# Patient Record
Sex: MALE | Race: WHITE | Employment: FULL TIME | URBAN - METROPOLITAN AREA
[De-identification: names, ages, dates, MRNs, and addresses within clinical notes are randomized per-mention and may not be internally consistent; named-entity substitution may affect disease eponyms.]

---

## 2018-03-12 ENCOUNTER — OFFICE VISIT (OUTPATIENT)
Dept: FAMILY MEDICINE CLINIC | Age: 48
End: 2018-03-12

## 2018-03-12 VITALS
HEART RATE: 61 BPM | OXYGEN SATURATION: 98 % | RESPIRATION RATE: 18 BRPM | HEIGHT: 75 IN | SYSTOLIC BLOOD PRESSURE: 147 MMHG | DIASTOLIC BLOOD PRESSURE: 89 MMHG | TEMPERATURE: 98.1 F

## 2018-03-12 DIAGNOSIS — S82.91XA CLOSED FRACTURE OF RIGHT LOWER LEG, INITIAL ENCOUNTER: Primary | ICD-10-CM

## 2018-03-12 RX ORDER — OXYCODONE AND ACETAMINOPHEN 10; 325 MG/1; MG/1
1 TABLET ORAL
Qty: 30 TAB | Refills: 0 | Status: SHIPPED | OUTPATIENT
Start: 2018-03-12 | End: 2018-04-06 | Stop reason: SDUPTHER

## 2018-03-12 RX ORDER — OXYCODONE AND ACETAMINOPHEN 10; 325 MG/1; MG/1
1 TABLET ORAL
COMMUNITY
End: 2018-03-13 | Stop reason: SDUPTHER

## 2018-03-12 RX ORDER — IBUPROFEN 600 MG/1
800 TABLET ORAL
COMMUNITY
End: 2018-06-11 | Stop reason: SDUPTHER

## 2018-03-12 RX ORDER — LEVOTHYROXINE SODIUM 75 UG/1
TABLET ORAL
COMMUNITY
End: 2018-11-07 | Stop reason: SDUPTHER

## 2018-03-12 NOTE — MR AVS SNAPSHOT
[facility] 
 
 
 [address] [address] 
[phone] Patient: Mamadou Cisneros MRN: [id number] BLW:1/8/2035 Visit Information Date & Time Provider Department Dept. Phone Encounter #  
 3/12/2018 11:45 AM Ne Aaron NP [address] [place] [address] [id number] Upcoming Health Maintenance Date Due DTaP/Tdap/Td series (1 - Tdap) 7/5/1989 Influenza Age 5 to Adult 8/1/2017 Allergies as of 3/12/2018  Review Complete On: 3/12/2018 By: Ne Aaron NP No Known Allergies Current Immunizations  Never Reviewed No immunizations on file. Not reviewed this visit You Were Diagnosed With   
  
 Codes Comments Closed fracture of right lower leg, initial encounter    -  Primary ICD-10-CM: S82. 91XA ICD-9-CM: 823.80 Vitals BP Pulse Temp Resp Height(growth percentile) SpO2  
 147/89 (BP 1 Location: Left arm, BP Patient Position: Sitting) 61 98.1 °F (36.7 °C) 18 6' 3\" (1.905 m) 98% Smoking Status Former Smoker Vitals History Your Updated Medication List  
  
   
This list is accurate as of 3/12/18 12:38 PM.  Always use your most recent med list.  
  
  
  
  
 ELIQUIS 2.5 mg tablet Generic drug:  apixaban Take 2.5 mg by mouth two (2) times a day. ibuprofen 600 mg tablet Commonly known as:  MOTRIN Take  by mouth every six (6) hours as needed for Pain.  
  
 levothyroxine 75 mcg tablet Commonly known as:  SYNTHROID Take  by mouth Daily (before breakfast). * oxyCODONE-acetaminophen  mg per tablet Commonly known as:  PERCOCET 10 Take 1 Tab by mouth every four (4) hours as needed for Pain. * oxyCODONE-acetaminophen  mg per tablet Commonly known as:  PERCOCET Take 1 Tab by mouth every four (4) hours as needed for Pain. Max Daily Amount: 6 Tabs. * Notice:   This list has 2 medication(s) that are the same as other medications prescribed for you. Read the directions carefully, and ask your doctor or other care provider to review them with you. Prescriptions Printed Refills  
 oxyCODONE-acetaminophen (PERCOCET)  mg per tablet 0 Sig: Take 1 Tab by mouth every four (4) hours as needed for Pain. Max Daily Amount: 6 Tabs. Class: Print Route: Oral  
  
We Performed the Following REFERRAL TO ORTHOPEDICS [TKO835 Custom] Referral Information Referral ID Referred By Referred To  
  
 [id number] BE, Melvi [facility]   
   [address], [address] [place] gal[place], [address] Phone: [phone] Fax: [phone] Visits Status Start Date End Date 1 New Request 3/12/18 3/12/19 If your referral has a status of pending review or denied, additional information will be sent to support the outcome of this decision. Patient Instructions Constipation: Duclolax powder twice a day Colase 100mg twice a day Introducing [facility] SERVICES! Mariya Hamm introduces [employer] patient portal. Now you can access parts of your medical record, email your doctor's office, and request medication refills online. 1. In your internet browser, go to [email or web address] 2. Click on the First Time User? Click Here link in the Sign In box. You will see the New Member Sign Up page. 3. Enter your [employer] Access Code exactly as it appears below. You will not need to use this code after youve completed the sign-up process. If you do not sign up before the expiration date, you must request a new code. · [employer] Access Code: [email or web address] Expires: 5/20/2018 10:20 AM 
 
4. Enter the last four digits of your Social Security Number (xxxx) and Date of Birth (mm/dd/yyyy) as indicated and click Submit. You will be taken to the next sign-up page. 5. Create a [employer] ID.  This will be your [employer] login ID and cannot be changed, so think of one that is secure and easy to remember. 6. Create a [employer] password. You can change your password at any time. 7. Enter your Password Reset Question and Answer. This can be used at a later time if you forget your password. 8. Enter your e-mail address. You will receive e-mail notification when new information is available in [address] 9. Click Sign Up. You can now view and download portions of your medical record. 10. Click the Download Summary menu link to download a portable copy of your medical information. If you have questions, please visit the Frequently Asked Questions section of the [employer] website. Remember, [employer] is NOT to be used for urgent needs. For medical emergencies, dial 911. Now available from your iPhone and Android! Please provide this summary of care documentation to your next provider. If you have any questions after today's visit, please call [phone].

## 2018-03-12 NOTE — PROGRESS NOTES
1. Have you been to the ER, urgent care clinic since your last visit? Hospitalized since your last visit? Yes     2. Have you seen or consulted any other health care providers outside of the [address] [place] [address] since your last visit? Include any pap smears or colon screening.  No   ]

## 2018-03-12 NOTE — PROGRESS NOTES
HISTORY OF PRESENT ILLNESS  Brianda Diaz is a 50 y.o. male. Patient presents today after breaking his leg in a skiing accident. HPI  Pt was skiing on 3-8-18 and was admitted to [facility] for surgery. He had comminuted tibia and fibular fracture of the right lower leg. Review of Systems   Constitutional: Negative. Gastrointestinal: Positive for constipation. Musculoskeletal: Positive for joint pain (right leg fracture. ). Visit Vitals    /89 (BP 1 Location: Left arm, BP Patient Position: Sitting)    Pulse 61    Temp 98.1 °F (36.7 °C)    Resp 18    Ht 6' 3\" (1.905 m)    SpO2 98%       Physical Exam   Constitutional: He appears well-developed. No distress. Cardiovascular: Normal rate, regular rhythm and normal heart sounds. Pulmonary/Chest: Effort normal and breath sounds normal. No respiratory distress. He has no wheezes. He has no rales. ASSESSMENT and PLAN    ICD-10-CM ICD-9-CM    1. Closed fracture of right lower leg, initial encounter S82. 91XA 823.80 REFERRAL TO ORTHOPEDICS      oxyCODONE-acetaminophen (PERCOCET)  mg per tablet     PLAN:  We discussed constipation:  Colace 100mg bid   Ducloax powder bid if needed. Pt ref to orth. Pt is to call with any concerns. Pt given after visit summary.

## 2018-03-13 ENCOUNTER — OFFICE VISIT (OUTPATIENT)
Dept: ORTHOPEDIC SURGERY | Facility: CLINIC | Age: 48
End: 2018-03-13

## 2018-03-13 VITALS
OXYGEN SATURATION: 97 % | TEMPERATURE: 98.1 F | HEIGHT: 75 IN | SYSTOLIC BLOOD PRESSURE: 128 MMHG | DIASTOLIC BLOOD PRESSURE: 95 MMHG | HEART RATE: 91 BPM | RESPIRATION RATE: 18 BRPM

## 2018-03-13 DIAGNOSIS — S82.451A CLOSED DISPLACED COMMINUTED FRACTURE OF SHAFT OF RIGHT FIBULA, INITIAL ENCOUNTER: ICD-10-CM

## 2018-03-13 DIAGNOSIS — S82.251A CLOSED DISPLACED COMMINUTED FRACTURE OF SHAFT OF RIGHT TIBIA, INITIAL ENCOUNTER: Primary | ICD-10-CM

## 2018-03-13 RX ORDER — OXYCODONE AND ACETAMINOPHEN 10; 325 MG/1; MG/1
1 TABLET ORAL
Qty: 40 TAB | Refills: 0 | Status: SHIPPED | OUTPATIENT
Start: 2018-03-13 | End: 2018-04-25

## 2018-03-13 NOTE — PATIENT INSTRUCTIONS
Broken Lower Leg: Care Instructions  Your Care Instructions    Treatment for your broken leg will depend on how bad the break is. Your doctor may have put your lower leg in a splint or a cast to allow it to heal or keep it stable until you see another doctor. It may take weeks or months for your leg to heal. You can help it heal with some care at home. You heal best when you take good care of yourself. Eat a variety of healthy foods, and don't smoke. Follow-up care is a key part of your treatment and safety. Be sure to make and go to all appointments, and call your doctor if you are having problems. It's also a good idea to know your test results and keep a list of the medicines you take. How can you care for yourself at home? · Put ice or a cold pack on your lower leg for 10 to 20 minutes at a time. Try to do this every 1 to 2 hours for the next 3 days (when you are awake). Put a thin cloth between the ice and your cast or splint. Keep your cast or splint dry. · Follow the cast care instructions your doctor gives you. If you have a splint, do not take it off unless your doctor tells you to. · Be safe with medicines. Take pain medicines exactly as directed. ¨ If the doctor gave you a prescription medicine for pain, take it as prescribed. ¨ If you are not taking a prescription pain medicine, ask your doctor if you can take an over-the-counter medicine. · Do not put weight on your leg unless your doctor tells you to. Use crutches to walk. · Prop up your leg on pillows when you sit or lie down in the first few days after the injury. Keep your leg higher than the level of your heart. This will help reduce swelling. · Follow instructions for exercises to keep your leg strong. · Wiggle your toes often to reduce swelling and stiffness. When should you call for help? Call 911 anytime you think you may need emergency care.  For example, call if:  ? · You have chest pain, are short of breath, or you cough up blood.   ? · You are very sleepy and you have trouble waking up. ?Call your doctor now or seek immediate medical care if:  ? · You have new or worse nausea or vomiting. ? · You have new or worse pain. ? · Your foot is cool or pale or changes color. ? · You have tingling, weakness, or numbness in your toes. ? · Your cast or splint feels too tight. ? · You have signs of a blood clot in your leg (called a deep vein thrombosis), such as:  ¨ Pain in your calf, back of the knee, thigh, or groin. ¨ Redness or swelling in your leg. ? Watch closely for changes in your health, and be sure to contact your doctor if:  ? · You have a problem with your splint or cast.   ? · You do not get better as expected. Where can you learn more? Go to http://vidya-sushila.info/. Enter L198 in the search box to learn more about \"Broken Lower Leg: Care Instructions. \"  Current as of: March 21, 2017  Content Version: 11.4  © 0302-6383 [employer]. Care instructions adapted under license by [employer] (which disclaims liability or warranty for this information). If you have questions about a medical condition or this instruction, always ask your healthcare professional. [address] any warranty or liability for your use of this information.

## 2018-03-20 ENCOUNTER — OFFICE VISIT (OUTPATIENT)
Dept: ORTHOPEDIC SURGERY | Facility: CLINIC | Age: 48
End: 2018-03-20

## 2018-03-20 DIAGNOSIS — S82.251D CLOSED DISPLACED COMMINUTED FRACTURE OF SHAFT OF RIGHT TIBIA WITH ROUTINE HEALING, SUBSEQUENT ENCOUNTER: ICD-10-CM

## 2018-03-20 DIAGNOSIS — S82.451D CLOSED DISPLACED COMMINUTED FRACTURE OF SHAFT OF RIGHT FIBULA WITH ROUTINE HEALING, SUBSEQUENT ENCOUNTER: ICD-10-CM

## 2018-03-20 DIAGNOSIS — M79.604 RIGHT LEG PAIN: Primary | ICD-10-CM

## 2018-03-20 RX ORDER — OXYCODONE AND ACETAMINOPHEN 7.5; 325 MG/1; MG/1
1-2 TABLET ORAL
Qty: 60 TAB | Refills: 0 | Status: SHIPPED | OUTPATIENT
Start: 2018-03-20 | End: 2018-04-25

## 2018-03-20 NOTE — PROGRESS NOTES
Patient: Luca Fisher                MRN: [id number]       SSN: [id number]  YOB: 1970        AGE: 50 y.o. SEX: male  There is no height or weight on file to calculate BMI. PCP: No primary care provider on file.  03/20/18    Chief Complaint: No changes from previous visit complaint. HISTORY OF PRESENT ILLNESS: Mr. Rachell Martinez returns today for follow-up of his recent right tibia fracture. He is now just shy of two weeks out from his right tibia IM nailing done elsewhere. He reports some swelling in his foot as well as pain. He is able to put some weight on it, but not full weight at this point due to pain. No issues with his incisions. No new complaints. Past Medical History:   Diagnosis Date    Thyroid disease        No family history on file. Current Outpatient Prescriptions   Medication Sig Dispense Refill    oxyCODONE-acetaminophen (PERCOCET) 7.5-325 mg per tablet Take 1-2 Tabs by mouth every six (6) hours as needed for Pain. Max Daily Amount: 8 Tabs. 60 Tab 0    oxyCODONE-acetaminophen (PERCOCET 10)  mg per tablet Take 1 Tab by mouth every four (4) hours as needed for Pain. Max Daily Amount: 6 Tabs. 40 Tab 0    levothyroxine (SYNTHROID) 75 mcg tablet Take  by mouth Daily (before breakfast).  apixaban (ELIQUIS) 2.5 mg tablet Take 2.5 mg by mouth two (2) times a day.  ibuprofen (MOTRIN) 600 mg tablet Take  by mouth every six (6) hours as needed for Pain.  oxyCODONE-acetaminophen (PERCOCET)  mg per tablet Take 1 Tab by mouth every four (4) hours as needed for Pain. Max Daily Amount: 6 Tabs. 30 Tab 0       No Known Allergies    No past surgical history on file. Social History     Social History    Marital status:      Spouse name: N/A    Number of children: N/A    Years of education: N/A     Occupational History    Not on file.      Social History Main Topics    Smoking status: Former Smoker    Smokeless tobacco: Never Used    Alcohol use No    Drug use: No    Sexual activity: Not on file     Other Topics Concern    Not on file     Social History Narrative       REVIEW OF SYSTEMS:      No changes from previous review of systems unless noted. PHYSICAL EXAMINATION:  There were no vitals taken for this visit. There is no height or weight on file to calculate BMI. GENERAL: Alert and oriented x3, in no acute distress. HEENT: Normocephalic, atraumatic. RESP: Non labored breathing. SKIN: No rashes or lesions noted. PHYSICAL EXAMINATION:  Examination of his right leg reveals no obvious deformity. He does have some swelling, as well as ecchymosis, which is to be expected in the right leg and foot. His calf is soft and compressible. His staples are in place which are removed. His surgical wounds appear to be healing well without any drainage or erythema. His sensation is intact to light touch distally. RADIOGRAPHS:  X-rays, two views of the right tibia and fibula were obtained today. These show anatomic fracture alignment with hardware in good position of the tibia fracture. There is some comminution and displacement in the fibular fracture. ASSESSMENT/PLAN:  Mr. Elsie Connor is now just shy of two weeks out from his right tibia nailing. He appears to be doing well thus far in recovery. He is progressing a little bit slow which is to be expected. He can continue to progress his weightbearing as tolerated. I took his staples out and put Steri-strips on his wounds today. I would like to see him back in two weeks for a repeat examination. At that point we would likely get him started in a little physical therapy. I also refilled his pain medicine and prescription as it is likely to run out in the next two weeks, but came down in the dose.             Electronically signed by: Maya Darnell MD

## 2018-03-27 ENCOUNTER — PATIENT MESSAGE (OUTPATIENT)
Dept: ORTHOPEDIC SURGERY | Facility: CLINIC | Age: 48
End: 2018-03-27

## 2018-03-27 DIAGNOSIS — S82.451D CLOSED DISPLACED COMMINUTED FRACTURE OF SHAFT OF RIGHT FIBULA WITH ROUTINE HEALING, SUBSEQUENT ENCOUNTER: ICD-10-CM

## 2018-03-27 DIAGNOSIS — S82.251D CLOSED DISPLACED COMMINUTED FRACTURE OF SHAFT OF RIGHT TIBIA WITH ROUTINE HEALING, SUBSEQUENT ENCOUNTER: ICD-10-CM

## 2018-03-27 RX ORDER — OXYCODONE AND ACETAMINOPHEN 5; 325 MG/1; MG/1
1 TABLET ORAL
Qty: 60 TAB | Refills: 0 | Status: SHIPPED | OUTPATIENT
Start: 2018-03-27 | End: 2018-04-25

## 2018-03-27 NOTE — TELEPHONE ENCOUNTER
From: Lakshmi Michael  Sent: 3/27/2018 3:03 PM EDT  Subject: Prescription Question    Can I  a refill of my pain medicine from Dr. Carolyn Barakat on Thursday or Friday. Not sure when he is in office. Thank you.

## 2018-04-02 ENCOUNTER — OFFICE VISIT (OUTPATIENT)
Dept: ORTHOPEDIC SURGERY | Facility: CLINIC | Age: 48
End: 2018-04-02

## 2018-04-02 VITALS
DIASTOLIC BLOOD PRESSURE: 72 MMHG | OXYGEN SATURATION: 99 % | HEIGHT: 75 IN | HEART RATE: 106 BPM | RESPIRATION RATE: 18 BRPM | SYSTOLIC BLOOD PRESSURE: 125 MMHG | TEMPERATURE: 97.4 F

## 2018-04-02 DIAGNOSIS — S82.451D CLOSED DISPLACED COMMINUTED FRACTURE OF SHAFT OF RIGHT FIBULA WITH ROUTINE HEALING, SUBSEQUENT ENCOUNTER: ICD-10-CM

## 2018-04-02 DIAGNOSIS — M89.8X6 PAIN OF RIGHT TIBIA: Primary | ICD-10-CM

## 2018-04-02 DIAGNOSIS — S82.251D CLOSED DISPLACED COMMINUTED FRACTURE OF SHAFT OF RIGHT TIBIA WITH ROUTINE HEALING, SUBSEQUENT ENCOUNTER: ICD-10-CM

## 2018-04-02 RX ORDER — OXYCODONE HYDROCHLORIDE 5 MG/1
TABLET ORAL
Refills: 0 | COMMUNITY
Start: 2018-03-10 | End: 2018-04-09 | Stop reason: SDUPTHER

## 2018-04-02 RX ORDER — ACETAMINOPHEN 325 MG/1
TABLET ORAL
COMMUNITY

## 2018-04-02 NOTE — PROGRESS NOTES
Patient: Alvaro Sanchez                MRN: [id number]       SSN: [id number]  YOB: 1970        AGE: 50 y.o. SEX: male  There is no height or weight on file to calculate BMI. PCP: No primary care provider on file. 04/02/18    Chief Complaint: No changes from previous visit complaint. HISTORY OF PRESENT ILLNESS: Mr. Rebecca Perla returns today for follow-up of his right tib/fib fracture. He is still having some mobility issues. He is using his crutch and is getting around better. He continues to report some pain in the right knee as well as some pain in the right tibia area. He has not had any falls or other injuries. Past Medical History:   Diagnosis Date    Thyroid disease        History reviewed. No pertinent family history. Current Outpatient Prescriptions   Medication Sig Dispense Refill    oxyCODONE IR (ROXICODONE) 5 mg immediate release tablet TAKE 1 TO 2 TABLETS BY MOUTH EVERY 4 HOURS AS NEEDED  0    acetaminophen (TYLENOL) 325 mg tablet Take  by mouth every four (4) hours as needed for Pain.  oxyCODONE-acetaminophen (PERCOCET 10)  mg per tablet Take 1 Tab by mouth every four (4) hours as needed for Pain. Max Daily Amount: 6 Tabs. 40 Tab 0    levothyroxine (SYNTHROID) 75 mcg tablet Take  by mouth Daily (before breakfast).  apixaban (ELIQUIS) 2.5 mg tablet Take 2.5 mg by mouth two (2) times a day.  oxyCODONE-acetaminophen (PERCOCET) 5-325 mg per tablet Take 1 Tab by mouth every six (6) hours as needed for Pain. Max Daily Amount: 4 Tabs. 60 Tab 0    oxyCODONE-acetaminophen (PERCOCET) 7.5-325 mg per tablet Take 1-2 Tabs by mouth every six (6) hours as needed for Pain. Max Daily Amount: 8 Tabs. 60 Tab 0    ibuprofen (MOTRIN) 600 mg tablet Take  by mouth every six (6) hours as needed for Pain.  oxyCODONE-acetaminophen (PERCOCET)  mg per tablet Take 1 Tab by mouth every four (4) hours as needed for Pain. Max Daily Amount: 6 Tabs.  27 Tab 0       No Known Allergies    History reviewed. No pertinent surgical history. Social History     Social History    Marital status:      Spouse name: N/A    Number of children: N/A    Years of education: N/A     Occupational History    Not on file. Social History Main Topics    Smoking status: Former Smoker    Smokeless tobacco: Never Used    Alcohol use No    Drug use: No    Sexual activity: Not on file     Other Topics Concern    Not on file     Social History Narrative       REVIEW OF SYSTEMS:      No changes from previous review of systems unless noted. PHYSICAL EXAMINATION:  Visit Vitals    /72    Pulse (!) 106    Temp 97.4 °F (36.3 °C) (Oral)    Resp 18    Ht 6' 3\" (1.905 m)    SpO2 99%     There is no height or weight on file to calculate BMI. GENERAL: Alert and oriented x3, in no acute distress. HEENT: Normocephalic, atraumatic. RESP: Non labored breathing. SKIN: No rashes or lesions noted. PHYSICAL EXAMINATION:  Physical examination of the right leg reveals healing surgical incisions. There is some mild swelling about the distal tibia as well as the foot and ankle on the right side. All of his incisions are healing well. There is no erythema or drainage. There is no calf tenderness to palpation. He is neurovascularly intact distally. RADIOGRAPHS:  X-rays, two views of the right tibia/fibula were taken today. They show interval healing of the tibia fracture with a nail in place and good alignment and position. There is also good alignment and a comminuted fibula fracture as previously noted. ASSESSMENT/PLAN:  Mr. Jorge Cardona is a 57-year-old male now three weeks out from his right tib/fib fracture and tibia IM nail. At this point I told him that he needs to start get going on improving his mobility. We discussed physical therapy, but I would hold off on this for another few weeks.   I would like to see him start to transition from two crutches to one crutch. I will see him back in three weeks.           Electronically signed by: Chuy Silva MD

## 2018-04-06 DIAGNOSIS — S82.91XA CLOSED FRACTURE OF RIGHT LOWER LEG, INITIAL ENCOUNTER: ICD-10-CM

## 2018-04-09 DIAGNOSIS — G89.18 POST-OPERATIVE PAIN: Primary | ICD-10-CM

## 2018-04-09 RX ORDER — OXYCODONE HYDROCHLORIDE 5 MG/1
TABLET ORAL
Qty: 50 TAB | Refills: 0 | Status: SHIPPED | OUTPATIENT
Start: 2018-04-09 | End: 2018-07-24

## 2018-04-11 RX ORDER — OXYCODONE AND ACETAMINOPHEN 10; 325 MG/1; MG/1
1 TABLET ORAL
Qty: 30 TAB | Refills: 0 | Status: SHIPPED | OUTPATIENT
Start: 2018-04-11 | End: 2018-04-25

## 2018-04-19 ENCOUNTER — TELEPHONE (OUTPATIENT)
Dept: ORTHOPEDIC SURGERY | Facility: CLINIC | Age: 48
End: 2018-04-19

## 2018-04-19 DIAGNOSIS — Z87.81: ICD-10-CM

## 2018-04-19 DIAGNOSIS — Z87.81 H/O FRACTURE OF TIBIA: Primary | ICD-10-CM

## 2018-04-19 NOTE — TELEPHONE ENCOUNTER
Patients wife called in requesting patient be set up for physical therapy since he has to wait to see Dr. Elizabeth Triana. Please advise Siddharth at [phone].

## 2018-04-25 DIAGNOSIS — S82.451D CLOSED DISPLACED COMMINUTED FRACTURE OF SHAFT OF RIGHT FIBULA WITH ROUTINE HEALING, SUBSEQUENT ENCOUNTER: ICD-10-CM

## 2018-04-25 DIAGNOSIS — S82.251D CLOSED DISPLACED COMMINUTED FRACTURE OF SHAFT OF RIGHT TIBIA WITH ROUTINE HEALING, SUBSEQUENT ENCOUNTER: ICD-10-CM

## 2018-04-25 RX ORDER — OXYCODONE AND ACETAMINOPHEN 7.5; 325 MG/1; MG/1
1-2 TABLET ORAL
Qty: 60 TAB | Refills: 0 | Status: SHIPPED | OUTPATIENT
Start: 2018-04-25 | End: 2018-05-07 | Stop reason: SDUPTHER

## 2018-04-27 ENCOUNTER — HOSPITAL ENCOUNTER (OUTPATIENT)
Dept: PHYSICAL THERAPY | Age: 48
Discharge: HOME | End: 2018-04-27
Payer: COMMERCIAL

## 2018-04-27 PROCEDURE — 97110 THERAPEUTIC EXERCISES: CPT | Performed by: PHYSICAL THERAPIST

## 2018-04-27 PROCEDURE — 97161 PT EVAL LOW COMPLEX 20 MIN: CPT | Performed by: PHYSICAL THERAPIST

## 2018-04-27 PROCEDURE — 97140 MANUAL THERAPY 1/> REGIONS: CPT | Performed by: PHYSICAL THERAPIST

## 2018-04-27 PROCEDURE — 97116 GAIT TRAINING THERAPY: CPT | Performed by: PHYSICAL THERAPIST

## 2018-04-27 NOTE — PROGRESS NOTES
PT DAILY TREATMENT NOTE/FOOT AND ANKLE EVAL 3-    Patient Name: Guille Burciaga  Date:2018  : 1970  [x]  Patient  Verified  Payor: Mateusz Spencer / Plan: Landon Hansen / Product Type: HMO /    In time:1030  Out time:1131  Total Treatment Time (min): 64  Visit #: 1 of 12    Treatment Area: Fracture of tibia [S82.209A]    SUBJECTIVE  Pain Level (0-10 scale): 1/10-currently, worst 2-3/10    Any medication changes, allergies to medications, adverse drug reactions, diagnosis change, or new procedure performed?: [x] No    [] Yes (see summary sheet for update)  Subjective functional status/changes:     Pt is a 50year old male sp R Tibia/Fibular fracture ORIF following a skiing injury on 3/8/18. Following surgery he was non-weightbearing for 3 weeks, then 10% weight-bearing, and then 50% weight-bearing now. Functional limitations include walking with crutching, ADLS because use of crutches, stair negotiation, and driving. Pt works in [employer] and is able to work from home on the computer. Negative for red flags. FOTO: 37/100      OBJECTIVE/EXAMINATION    31 min [x]Eval                  []Re-Eval       10 min Therapeutic Exercise:  [] See flow sheet : established/educated HEP   Rationale: increase ROM to improve the patients ability to progress weight-bearing tolerance     10 min Manual Therapy:  PROM of the R ankle, AP talocural mobs grade III, passive Gastroc stretch   Rationale: decrease pain, increase ROM and increase tissue extensibility to progress towards full weight-bearing without the boot.      10 min Gait Training: Weight-bearing with B axillary crutches demonstrated weight-bearing through the R LE   Rationale: to improve gait mechanics          With   [] TE   [] TA   [] neuro   [] other: Patient Education: [x] Review HEP    [] Progressed/Changed HEP based on:   [] positioning   [] body mechanics   [] transfers   [] heat/ice application    [] other:          Physical Therapy Evaluation  - Foot and Ankle    Gait: [] Normal    [x] Abnormal    [] Antalgic    [] NWB    Device: A axillary crutches Describe: 3 point gait pattern, non-weight-bearing through L LE    ROM/Strength  [] Unable to assess at this time      AROM        PROM            Strength (1-5)   Left Right Left Right Left  Right   Dorsiflexion 10 -20   5 NT      Plantarflexion 50 40   5 NT      Inversion 32 20   5 NT      Eversion 10 10   5 NT   Great Toe Ext         Great Toe Flex           Flexibility: [] Unable to assess at this time  Gastroc:    (L) Tightness [] WNL   [] Min   [] Mod   [x] Severe    (R) Tightness [] WNL   [] Min   [] Mod   [x] Severe  Soleus:    (L) Tightness [] WNL   [] Min   [] Mod   [x] Severe    (R) Tightness [] WNL   [] Min   [] Mod   [x] Severe  Other:      (L) Tightness [] WNL   [] Min   [] Mod   [] Severe    (R) Tightness [] WNL   [] Min   [] Mod   [] Severe      Pain Level (0-10 scale) post treatment: 0/10    ASSESSMENT/Changes in Function:   [x]  See Plan of Care  []  See progress note/recertification  []  See Discharge Summary         Progress towards goals / Updated goals:  PER POC    PLAN  [x]  Upgrade activities as tolerated     [x]  Continue plan of care  []  Update interventions per flow sheet       []  Discharge due to:_  [x]  Other:2-3x/week for 4 weeks    Justification for Eval Code Complexity:  Patient History : none  Examination see exam   Clinical Presentation: evolving  Clinical Decision Making : FOTO : [id number] [facility] 4/27/2018  10:39 AM

## 2018-04-29 NOTE — PROGRESS NOTES
[address] PH[facility] THERAPY AT [facility]  [address] [address], [place], [place]  Phone [phone]  Fax [phone] / [address] [address] PHYSICAL THERAPY SERVICES  Patient Name: Cristy Perez : 1970   Medical   Diagnosis: Fracture of tibia [S82.209A] Treatment Diagnosis: R LE pain   Onset Date: 3/21/18     Referral Source: Sabrina Powell MD [facility]): 2018   Prior Hospitalization: See medical history Provider #: [id number]   Prior Level of Function: IND    Comorbidities: Thyroid problems   Medications: Verified on Patient Summary List   The Plan of Care and following information is based on the information from the initial evaluation.   =================================================================================  Assessment / key information:  Pt is a 50year old male sp R Tibia/Fibular fracture ORIF following a skiing injury on 3/8/18. Following surgery he was non-weightbearing for 3 weeks, then 10% weight-bearing, and then 50% weight-bearing now. Functional limitations include walking with crutching, ADLS because use of crutches, stair negotiation, and driving. 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 Negative for red flags. FOTO: 37/100. Upon evaluation, pt presents with B axillary crutches demonstrating non-weightbearing on the R LE. Pt educated to ambulate with noted CAM boot demonstrating WBAT through the R LE per MD recommendation. Pt presents with increased tightness throughout the R LE, decreased A/PROM of the R foot/ankle, and decreased strength/balance/priorpocetion secondary to recent surgical interventions.  Pt would benefit from a course of skilled PT to address above deficits to return to PLOF symptom free.      ROM/Strength  [] Unable to assess at this time                                                                     AROM                             PROM Strength (1-5)    Left Right Left Right Left  Right   Dorsiflexion 10 -20     5 NT       Plantarflexion 50 40     5 NT       Inversion 32 20     5 NT       Eversion 10 10     5 NT   Great Toe Ext               Great Toe Flex                    =================================================================================  Eval Complexity: History: LOW Complexity : Zero comorbidities / personal factors that will impact the outcome / POCExam:HIGH Complexity : 4+ Standardized tests and measures addressing body structure, function, activity limitation and / or participation in recreation  Presentation: MEDIUM Complexity : Evolving with changing characteristics  Clinical Decision Making:MEDIUM Complexity : FOTO score of 26-74Overall Complexity:LOW   Problem List: pain affecting function, decrease ROM, decrease strength, edema affecting function, impaired gait/ balance, decrease ADL/ functional abilitiies, decrease activity tolerance, decrease flexibility/ joint mobility and decrease transfer abilities   Treatment Plan may include any combination of the following: Therapeutic exercise, Therapeutic activities, Neuromuscular re-education, Physical agent/modality, Gait/balance training, Manual therapy and Patient education  Patient / Family readiness to learn indicated by: asking questions and trying to perform skills  Persons(s) to be included in education: patient (P)  Barriers to Learning/Limitations: None  Measures taken:    Patient Goal (s): Return to walking   Patient self reported health status: excellent  Rehabilitation Potential: good   Short Term Goals: To be accomplished in  2  weeks:  1. Pt will be IND and compliant with HEP for self-management of symptoms. 2. Pt will progress to ambulation in CAM boot and 1 crutch to progress towards IND ambulation.  Long Term Goals: To be accomplished in  4  weeks:  1. Pt will improve R ankle ROM = to the L to improve gait mechanics.    2. Pt will improve R ankle strength to 5/5 to be able to ambulate without AD demonstrating normalized gait on level/unlevel surfaces. 3. Pt will be able to perform SLS on the R LE for 30 sec to improve proprioception and SL balance. 4. Pt will improve FOTO score to at least 59/100 as a functional indicator of improved mobility. Frequency / Duration:   Patient to be seen  2-3  times per week for 4  weeks:  Patient / Caregiver education and instruction: exercises  G-Codes (GP): LUIS  Therapist Signature: Ara Perkins DPT Date: 9/59/7385   Certification Period: NA Time: 11:47 AM   ===========================================================================================  I certify that the above Physical Therapy Services are being furnished while the patient is under my care. I agree with the treatment plan and certify that this therapy is necessary. Physician Signature:        Date:       Time:     Please sign and return to [facility] or you may fax the signed copy to [phone]. Thank you.

## 2018-05-01 ENCOUNTER — HOSPITAL ENCOUNTER (OUTPATIENT)
Dept: PHYSICAL THERAPY | Age: 48
Discharge: HOME | End: 2018-05-01
Payer: COMMERCIAL

## 2018-05-01 PROCEDURE — 97016 VASOPNEUMATIC DEVICE THERAPY: CPT

## 2018-05-01 PROCEDURE — 97110 THERAPEUTIC EXERCISES: CPT

## 2018-05-01 PROCEDURE — 97140 MANUAL THERAPY 1/> REGIONS: CPT

## 2018-05-01 PROCEDURE — 97116 GAIT TRAINING THERAPY: CPT

## 2018-05-01 NOTE — PROGRESS NOTES
PT DAILY TREATMENT NOTE     Patient Name: Slim Monteiro  Date:2018  : 1970  [x]  Patient  Verified  Payor: Mame Meyers / Plan: Warner Kimball / Product Type: HMO /    In time: 8:30 Out time:9:35  Total Treatment Time (min): 72  Visit #: 2 of     Treatment Area: Fracture of tibia [C60.836V]    SUBJECTIVE  Pain Level IN: (0-10 scale): 0/10   Pain Level OUT: (0-10 scale) post treatment: 0/10    Any medication changes, allergies to medications, adverse drug reactions, diagnosis change, or new procedure performed?: [x] No    [] Yes (see summary sheet for update)  Subjective functional status/changes:   [] No changes reported  I just have a hard time with putting weight onto the (R) leg with one crutch and putting that crutch in the left hand - it makes more sense to place the crutch into the (R) hand     OBJECTIVE    Modality rationale: decrease edema, decrease inflammation and decrease pain to improve the patients ability to weight bear into the (R) leg    Min Type Additional Details    [] Estim:  []Unatt       []IFC  []Premod                        []Other:  []w/ice   []w/heat  Position:  Location:    [] Estim: []Att    []TENS instruct  []NMES                    []Other:  []w/US   []w/ice   []w/heat  Position:  Location:    []  Traction: [] Cervical       []Lumbar                       [] Prone          []Supine                       []Intermittent   []Continuous Lbs:  [] before manual  [] after manual    []  Ultrasound: []Continuous   [] Pulsed                           []1MHz   []3MHz W/cm2:  Location:    []  Iontophoresis with dexamethasone         Location: [] Take home patch   [] In clinic    []  Ice     []  heat  []  Ice massage  []  Laser   []  Anodyne Position:  Location:    []  Laser with stim  []  Other:  Position:  Location:   15 [x]  Vasopneumatic Device - to (R) leg and (R) foot/ankle Pressure:       [] lo [x] med [] hi   Temperature: [x] lo [] med [] hi   [] Skin assessment post-treatment:  []intact []redness- no adverse reaction    []redness  adverse reaction:       20 min Therapeutic Exercise:  [x] See flow sheet : first follow up visit since initial evaluation - initiated POC per flow sheet    Rationale: increase ROM, increase strength, improve coordination, improve balance and increase proprioception to improve the patients ability to ambulate with normal gait pattern     20 min Manual Therapy: Technique:      [] S/DTM []IASTM []PROM [] Passive Stretching   [] Graston:  [] GT 1  [] GT 2 [] GT 3 [] GT 4 [] GT 4 [] GT 5  [] GT 6  [] Sweep [] Fan [] [place]  [] Brush   []  Swivel []J- Stroke [] Scoop []IFraming     []Manual TPR  [] TDN (see objective; actual needle insertion time not billed)  []Jt manipulation:Gr I [] II []  III [] IV[] V[]  Treatment/Area: With leg elevated on wedge - performed effleurage to (R) leg to assist with decreasing edema     Rationale:      decrease pain, increase ROM, increase tissue extensibility and decrease trigger points to improve patient's ability to ambulated and return to normal ADLs    10 min Gait Training:  Instructed and demonstrate in proper gait pattern with use of one crutch and with two crutches - patient wanted to place the crutch into the (R) hand    Rationale: With   [] TE   [] TA   [] neuro   [] other: Patient Education: [x] Review HEP    [] Progressed/Changed HEP based on:   [] positioning   [] body mechanics   [] transfers   [] heat/ice application    [] Graston Education: Explained the effects and benefits of Graston Technique therapy including potential for post treatment soreness and bruising.   [] Other:           Objective/Functional Measures with Therapist Assessment Noted with Response to Therapy Session[de-identified]   first follow up visit since initial evaluation -         initiated POC per flow sheet   Patient has difficulity with ambulation with one crutch in the (L) hand - during swing phase of the (L) leg patient bends the right knee and performs a step too step and reports increase pain to the leg with weight bearing thru the leg - stressed the importance of practicing proper gait pattern in the house and to take some pain medication to assist with decreasing some pain to allow the patient to be able to perform the activity     ASSESSMENT/Changes in Function:   Patient will continue to benefit from skilled PT services to modify and progress therapeutic interventions, address functional mobility deficits, address ROM deficits, address strength deficits, analyze and modify body mechanics/ergonomics, assess and modify postural abnormalities and instruct in home and community integration to attain remaining goals. [x]  See Plan of Care  []  See progress note/recertification  []  See Discharge Summary         Progress towards goals / Updated goals: · Short Term Goals: To be accomplished in  2  weeks:  1. Pt will be IND and compliant with HEP for self-management of symptoms. 2. Pt will progress to ambulation in CAM boot and 1 crutch to progress towards IND ambulation. · Long Term Goals: To be accomplished in  4  weeks:  1. Pt will improve R ankle ROM = to the L to improve gait mechanics. 2. Pt will improve R ankle strength to 5/5 to be able to ambulate without AD demonstrating normalized gait on level/unlevel surfaces. 3. Pt will be able to perform SLS on the R LE for 30 sec to improve proprioception and SL balance. 4. Pt will improve FOTO score to at least 59/100 as a functional indicator of improved mobility.      PLAN  [x]  Upgrade activities as tolerated     [x]  Continue plan of care  []  Update interventions per flow sheet       []  Discharge due to:_  []  Other:_      Irasema Hu PTA 5/1/2018  8:56 AM    Future Appointments  Date Time Provider Twila Hale   5/2/2018 10:45 AM Cassandra Lr PA-C JUDY PASTORA SCHED   5/4/2018 10:30 AM Tye Love DPT [facility]   5/8/2018 8:30 AM Irasema Hu PTA [facility] 5/10/2018 9:00 AM Ara Aliment, DPT [facility]   5/15/2018 9:00 AM Payton Reed PTA [facility]   5/17/2018 8:30 AM Ara Aliment, DPT [facility]   5/22/2018 9:00 AM Ara Aliment, DPT [facility]   5/24/2018 9:00 AM Ara Aliment, DPT [facility]   5/29/2018 9:00 AM Ara Aliment, DPT [facility]   5/31/2018 9:00 AM Ara Aliment, DPT [facility]

## 2018-05-02 ENCOUNTER — OFFICE VISIT (OUTPATIENT)
Dept: ORTHOPEDIC SURGERY | Facility: CLINIC | Age: 48
End: 2018-05-02

## 2018-05-02 VITALS
SYSTOLIC BLOOD PRESSURE: 135 MMHG | HEART RATE: 96 BPM | DIASTOLIC BLOOD PRESSURE: 87 MMHG | OXYGEN SATURATION: 95 % | BODY MASS INDEX: 30.21 KG/M2 | TEMPERATURE: 97.3 F | HEIGHT: 75 IN | WEIGHT: 243 LBS

## 2018-05-02 DIAGNOSIS — Z87.81 H/O FRACTURE OF TIBIA: Primary | ICD-10-CM

## 2018-05-02 DIAGNOSIS — S82.251D CLOSED DISPLACED COMMINUTED FRACTURE OF SHAFT OF RIGHT TIBIA WITH ROUTINE HEALING, SUBSEQUENT ENCOUNTER: ICD-10-CM

## 2018-05-02 DIAGNOSIS — Z87.81: ICD-10-CM

## 2018-05-02 NOTE — PROGRESS NOTES
Patient: Osman Sheridan                MRN: [id number]       SSN: [id number]  YOB: 1970        AGE: 50 y.o. SEX: male  Body mass index is 30.37 kg/(m^2). PCP: Kelsi Timmons NP  05/02/18 05/02/18: Patient just started PT OP, with boot he is 2/3 weight bearing. Using crutches for ambulation. Pain improved. Chief Complaint: No changes from previous visit complaint. HISTORY OF PRESENT ILLNESS: Mr. Heidi Deluca returns today for follow-up of his right tib/fib fracture. He is still having some mobility issues. He is using his crutch and is getting around better. He continues to report some pain in the right knee as well as some pain in the right tibia area. He has not had any falls or other injuries. Past Medical History:   Diagnosis Date    Thyroid disease        No family history on file. Current Outpatient Prescriptions   Medication Sig Dispense Refill    levothyroxine (SYNTHROID) 75 mcg tablet Take  by mouth Daily (before breakfast).  ibuprofen (MOTRIN) 600 mg tablet Take  by mouth every six (6) hours as needed for Pain.  oxyCODONE-acetaminophen (PERCOCET) 7.5-325 mg per tablet Take 1-2 Tabs by mouth every six (6) hours as needed for Pain. Max Daily Amount: 8 Tabs. 60 Tab 0    oxyCODONE IR (ROXICODONE) 5 mg immediate release tablet TAKE 1 TO 2 TABLETS BY MOUTH EVERY 4 HOURS AS NEEDED 50 Tab 0    acetaminophen (TYLENOL) 325 mg tablet Take  by mouth every four (4) hours as needed for Pain.  apixaban (ELIQUIS) 2.5 mg tablet Take 2.5 mg by mouth two (2) times a day. No Known Allergies    No past surgical history on file. Social History     Social History    Marital status:      Spouse name: N/A    Number of children: N/A    Years of education: N/A     Occupational History    Not on file.      Social History Main Topics    Smoking status: Former Smoker    Smokeless tobacco: Never Used    Alcohol use No    Drug use: No    Sexual activity: Not on file     Other Topics Concern    Not on file     Social History Narrative       REVIEW OF SYSTEMS:      No changes from previous review of systems unless noted. PHYSICAL EXAMINATION:  Visit Vitals    /87 (BP 1 Location: Left arm)    Pulse 96    Temp 97.3 °F (36.3 °C)    Ht 6' 3\" (1.905 m)    Wt 243 lb (110.2 kg)    SpO2 95%    BMI 30.37 kg/m2     Body mass index is 30.37 kg/(m^2). GENERAL: Alert and oriented x3, in no acute distress. HEENT: Normocephalic, atraumatic. RESP: Non labored breathing. SKIN: No rashes or lesions noted. PHYSICAL EXAMINATION:  Physical examination of the right leg reveals healing surgical incisions. There is some mild swelling about the distal tibia as well as the foot and ankle on the right side. All of his incisions are healing well. There is no erythema or drainage. There is no calf tenderness to palpation. He is neurovascularly intact distally. RADIOGRAPHS:  X-rays, two views of the right tibia/fibula were taken today. They show interval healing of the tibia fracture with a nail in place and good alignment and position. There is also good alignment and a comminuted fibula fracture as previously noted. Subtle callous inlay associated with fibular fracture site. ASSESSMENT/PLAN:  Mr. Yuan Nash is a 24-year-old male now three weeks out from his right tib/fib fracture and tibia IM nail. Continue PT. I will see him back in three weeks. X rays reviewed and all questions answered to his satisfaction.           Electronically signed by: Kenroy Ferguson PA-C

## 2018-05-04 ENCOUNTER — HOSPITAL ENCOUNTER (OUTPATIENT)
Dept: PHYSICAL THERAPY | Age: 48
Discharge: HOME | End: 2018-05-04
Payer: COMMERCIAL

## 2018-05-04 PROCEDURE — 97110 THERAPEUTIC EXERCISES: CPT | Performed by: PHYSICAL THERAPIST

## 2018-05-04 PROCEDURE — 97016 VASOPNEUMATIC DEVICE THERAPY: CPT | Performed by: PHYSICAL THERAPIST

## 2018-05-04 PROCEDURE — 97140 MANUAL THERAPY 1/> REGIONS: CPT | Performed by: PHYSICAL THERAPIST

## 2018-05-04 NOTE — PROGRESS NOTES
PT DAILY TREATMENT NOTE     Patient Name: Mery Pappas  Date:2018  : 1970  [x]  Patient  Verified  Payor: Juani Madrigal / Plan: Daniele Commerce / Product Type: HMO /    In time: 8579 Out time:1150  Total Treatment Time (min): 78  Visit #: 3 of     Treatment Area: Fracture of tibia [Z17.238S]    SUBJECTIVE  Pain Level IN: (0-10 scale): 110   Pain Level OUT: (0-10 scale) post treatment: 1/10    Any medication changes, allergies to medications, adverse drug reactions, diagnosis change, or new procedure performed?: [x] No    [] Yes (see summary sheet for update)  Subjective functional status/changes:   [] No changes reported  I saw the doctor yesterday.  He said that I should put as much weight without increased pain or it wont heal\"    OBJECTIVE    Modality rationale: decrease edema, decrease inflammation and decrease pain to improve the patients ability to weight bear into the (R) leg    Min Type Additional Details    [] Estim:  []Unatt       []IFC  []Premod                        []Other:  []w/ice   []w/heat  Position:  Location:    [] Estim: []Att    []TENS instruct  []NMES                    []Other:  []w/US   []w/ice   []w/heat  Position:  Location:    []  Traction: [] Cervical       []Lumbar                       [] Prone          []Supine                       []Intermittent   []Continuous Lbs:  [] before manual  [] after manual    []  Ultrasound: []Continuous   [] Pulsed                           []1MHz   []3MHz W/cm2:  Location:    []  Iontophoresis with dexamethasone         Location: [] Take home patch   [] In clinic    []  Ice     []  heat  []  Ice massage  []  Laser   []  Anodyne Position:  Location:    []  Laser with stim  []  Other:  Position:  Location:   15 [x]  Vasopneumatic Device - to (R) leg and (R) foot/ankle Pressure:       [] lo [x] med [] hi   Temperature: [x] lo [] med [] hi   [] Skin assessment post-treatment:  []intact []redness- no adverse reaction    []redness  adverse reaction:       48 min Therapeutic Exercise:  [x] See flow sheet :   Rationale: increase ROM, increase strength, improve coordination, improve balance and increase proprioception to improve the patients ability to ambulate with normal gait pattern     15 min Manual Therapy: Technique:      [x] S/DTM []IASTM [x]PROM [] Passive Stretching   [x] Graston:  [] GT 1  [] GT 2 [] GT 3 [x] GT 4 [] GT 4 [] GT 5  [] GT 6  [] Sweep [] Fan [] [place]  [] Brush   []  Swivel []J- Stroke [] Scoop []IFraming     []Manual TPR  [] TDN (see objective; actual needle insertion time not billed)  []Jt manipulation:Gr I [] II []  III [] IV[] V[]  Treatment/Area: DTM to R Plantar fascia, and PROM to the R ankle    Rationale:      decrease pain, increase ROM, increase tissue extensibility and decrease trigger points to improve patient's ability to ambulated and return to normal ADLs       With   [] TE   [] TA   [] neuro   [] other: Patient Education: [x] Review HEP    [] Progressed/Changed HEP based on:   [] positioning   [] body mechanics   [] transfers   [] heat/ice application    [] Graston Education: Explained the effects and benefits of Graston Technique therapy including potential for post treatment soreness and bruising. [] Other:           Objective/Functional Measures with Therapist Assessment Noted with Response to Therapy Session[de-identified]  Pt continues to demo decreased heel toe pattern on the R LE with full weight through the R LE secondary to pain and fear. Noted tension/pain in the R plantar fascia- pt educated to roll bottom of foot with tennis ball and or frozen bottle of water       ASSESSMENT/Changes in Function:   Pt continue to present with limitations secondary to recent surgical interventions. Noted fear of falling and placing weight through the R LE is still present. Increased tightness and decreased ankle mobility noted. Continue to progress as tolerated.      Patient will continue to benefit from skilled PT services to modify and progress therapeutic interventions, address functional mobility deficits, address ROM deficits, address strength deficits, analyze and modify body mechanics/ergonomics, assess and modify postural abnormalities and instruct in home and community integration to attain remaining goals. Progress towards goals / Updated goals: · Short Term Goals: To be accomplished in  2  weeks:  1. Pt will be IND and compliant with HEP for self-management of symptoms. progressing, 5/4/18  2. Pt will progress to ambulation in CAM boot and 1 crutch to progress towards IND ambulation. Limited 5/4/18  · Long Term Goals: To be accomplished in  4  weeks:  1. Pt will improve R ankle ROM = to the L to improve gait mechanics. 2. Pt will improve R ankle strength to 5/5 to be able to ambulate without AD demonstrating normalized gait on level/unlevel surfaces. 3. Pt will be able to perform SLS on the R LE for 30 sec to improve proprioception and SL balance. 4. Pt will improve FOTO score to at least 59/100 as a functional indicator of improved mobility.      PLAN  [x]  Upgrade activities as tolerated     [x]  Continue plan of care  []  Update interventions per flow sheet       []  Discharge due to:_  []  Other:_      Ramirez Shaikh DPT 5/4/2018  350 PM    Future Appointments  Date Time Provider Twila Hale   5/8/2018 8:30 AM Kathy Fountain PTA [facility]   5/10/2018 9:00 AM Ramirez Shaikh DPT [facility]   5/15/2018 9:00 AM Kathy Fountain PTA [facility]   5/17/2018 8:30 AM Ramirez Shaikh DPT [facility]   5/22/2018 9:00 AM Kathy Fountain PTA [facility]   5/23/2018 3:00 PM CARLO Cota 67   5/24/2018 9:00 AM Ramirez Shaikh DPT [facility]   5/29/2018 9:00 AM Ramirez Shaikh, DIANE [facility]   5/31/2018 9:00 AM Ramirez Shaikh, DIANE [facility]

## 2018-05-07 ENCOUNTER — PATIENT MESSAGE (OUTPATIENT)
Dept: FAMILY MEDICINE CLINIC | Age: 48
End: 2018-05-07

## 2018-05-08 ENCOUNTER — HOSPITAL ENCOUNTER (OUTPATIENT)
Dept: PHYSICAL THERAPY | Age: 48
Discharge: HOME | End: 2018-05-08
Payer: COMMERCIAL

## 2018-05-08 PROCEDURE — 97016 VASOPNEUMATIC DEVICE THERAPY: CPT

## 2018-05-08 PROCEDURE — 97140 MANUAL THERAPY 1/> REGIONS: CPT

## 2018-05-08 PROCEDURE — 97110 THERAPEUTIC EXERCISES: CPT

## 2018-05-08 NOTE — PROGRESS NOTES
PT DAILY TREATMENT NOTE     Patient Name: Estee Doing  Date:2018  : 1970  [x]  Patient  Verified  Payor: Ashlee Lopez / Plan: Madalyn Ortiz / Product Type: HMO /    In time: 8:32 Out time:9:45  Total Treatment Time (min): 68  Visit #: 4 of     Treatment Area: Fracture of tibia [J81.307D]    SUBJECTIVE  Pain Level IN: (0-10 scale): 1/10   Pain Level OUT: (0-10 scale) post treatment: 1/10    Any medication changes, allergies to medications, adverse drug reactions, diagnosis change, or new procedure performed?: [x] No    [] Yes (see summary sheet for update)  Subjective functional status/changes:   [] No changes reported  I am still having a hard time putting weight on my leg because it still hurts too bad. I have be careful no to jostle my leg but put steady weight on it.     OBJECTIVE    Modality rationale: decrease edema, decrease inflammation and decrease pain to improve the patients ability to weight bear into the (R) leg    Min Type Additional Details    [] Estim:  []Unatt       []IFC  []Premod                        []Other:  []w/ice   []w/heat  Position:  Location:    [] Estim: []Att    []TENS instruct  []NMES                    []Other:  []w/US   []w/ice   []w/heat  Position:  Location:    []  Traction: [] Cervical       []Lumbar                       [] Prone          []Supine                       []Intermittent   []Continuous Lbs:  [] before manual  [] after manual    []  Ultrasound: []Continuous   [] Pulsed                           []1MHz   []3MHz W/cm2:  Location:    []  Iontophoresis with dexamethasone         Location: [] Take home patch   [] In clinic    []  Ice     []  heat  []  Ice massage  []  Laser   []  Anodyne Position:  Location:    []  Laser with stim  []  Other:  Position:  Location:   15 [x]  Vasopneumatic Device - to (R) leg and (R) foot/ankle Pressure:       [] lo [x] med [] hi   Temperature: [x] lo [] med [] hi   [] Skin assessment post-treatment:  []intact []redness- no adverse reaction    []redness  adverse reaction:       43/33 min Therapeutic Exercise:  [x] See flow sheet : 10 mins NC for warm up and waiting on therapist  Added Amira Bill in side lying    Rationale: increase ROM, increase strength, improve coordination, improve balance and increase proprioception to improve the patients ability to ambulate with normal gait pattern     15 min Manual Therapy: Technique:      [x] S/DTM []IASTM [x]PROM [] Passive Stretching   [x] Graston:  [] GT 1  [] GT 2 [] GT 3 [x] GT 4 [] GT 4 [] GT 5  [] GT 6  [] Sweep [] Fan [] [place]  [] Brush   []  Swivel []J- Stroke [] Scoop []IFraming     []Manual TPR  [] TDN (see objective; actual needle insertion time not billed)  []Jt manipulation:Gr I [] II []  III [] IV[] V[]  Treatment/Area: DTM to R Plantar fascia, and PROM to the R ankle    Rationale:      decrease pain, increase ROM, increase tissue extensibility and decrease trigger points to improve patient's ability to ambulated and return to normal ADLs       With   [] TE   [] TA   [] neuro   [] other: Patient Education: [x] Review HEP    [] Progressed/Changed HEP based on:   [] positioning   [] body mechanics   [] transfers   [] heat/ice application    [] Graston Education: Explained the effects and benefits of Graston Technique therapy including potential for post treatment soreness and bruising.   [] Other:           Objective/Functional Measures with Therapist Assessment Noted with Response to Therapy Session[de-identified]  Patient is still very reluctant with weight bearing into the (R) leg secondary to increase pain   Patient was able to ride the stationary bike with no boot on with no pain or discomfort  Patient needs to increase weight bearing through the day to aid with new bone growth around the hardware       ASSESSMENT/Changes in Function:     Patient will continue to benefit from skilled PT services to modify and progress therapeutic interventions, address functional mobility deficits, address ROM deficits, address strength deficits, analyze and modify body mechanics/ergonomics, assess and modify postural abnormalities and instruct in home and community integration to attain remaining goals. Progress towards goals / Updated goals: · Short Term Goals: To be accomplished in  2  weeks:  1. Pt will be IND and compliant with HEP for self-management of symptoms. progressing, 5/4/18  2. Pt will progress to ambulation in CAM boot and 1 crutch to progress towards IND ambulation. Limited 5/4/18  · Long Term Goals: To be accomplished in  4  weeks:  1. Pt will improve R ankle ROM = to the L to improve gait mechanics. 2. Pt will improve R ankle strength to 5/5 to be able to ambulate without AD demonstrating normalized gait on level/unlevel surfaces. 3. Pt will be able to perform SLS on the R LE for 30 sec to improve proprioception and SL balance. 4. Pt will improve FOTO score to at least 59/100 as a functional indicator of improved mobility.      PLAN  [x]  Upgrade activities as tolerated     [x]  Continue plan of care  []  Update interventions per flow sheet       []  Discharge due to:_  []  Other:_      Payton Reed PTA 5/8/2018  350 PM    Future Appointments  Date Time Provider Twila Hale   5/10/2018 9:00 AM Ara Perkins DPT [facility]   5/15/2018 9:00 AM Payton Reed PTA [facility]   5/17/2018 8:30 AM Ara Perkins DPT [facility]   5/22/2018 9:00 AM Payton Reed PTA [facility]   5/23/2018 3:00 PM Danni Harper PA-C [facility]   5/24/2018 9:00 AM Ara Perikns DPT [facility]   5/29/2018 9:00 AM Ara Perkins DPT [facility]   5/31/2018 9:00 ANDREI Perkins, DPT [facility]

## 2018-05-10 ENCOUNTER — APPOINTMENT (OUTPATIENT)
Dept: PHYSICAL THERAPY | Age: 48
End: 2018-05-10
Payer: COMMERCIAL

## 2018-05-11 ENCOUNTER — HOSPITAL ENCOUNTER (OUTPATIENT)
Dept: PHYSICAL THERAPY | Age: 48
Discharge: HOME | End: 2018-05-11
Payer: COMMERCIAL

## 2018-05-11 PROCEDURE — 97016 VASOPNEUMATIC DEVICE THERAPY: CPT

## 2018-05-11 PROCEDURE — 97140 MANUAL THERAPY 1/> REGIONS: CPT

## 2018-05-11 PROCEDURE — 97110 THERAPEUTIC EXERCISES: CPT

## 2018-05-11 NOTE — PROGRESS NOTES
PT DAILY TREATMENT NOTE     Patient Name: Maria M Mallory  Date:2018  : 1970  [x]  Patient  Verified  Payor: Kole Yousif / Plan: Gareth Gaitan / Product Type: HMO /    In time: 9:10 Out time:10:05  Total Treatment Time (min): 54  Visit #: 5 of     Treatment Area: Fracture of tibia [D41.327R]    SUBJECTIVE  Pain Level IN: (0-10 scale): 1/10   Pain Level OUT: (0-10 scale) post treatment: 1/10    Any medication changes, allergies to medications, adverse drug reactions, diagnosis change, or new procedure performed?: [x] No    [] Yes (see summary sheet for update)  Subjective functional status/changes:   [] No changes reported  I am still doing about the same - it can be frustrating at time     OBJECTIVE    Modality rationale: decrease edema, decrease inflammation and decrease pain to improve the patients ability to weight bear into the (R) leg    Min Type Additional Details    [] Estim:  []Unatt       []IFC  []Premod                        []Other:  []w/ice   []w/heat  Position:  Location:    [] Estim: []Att    []TENS instruct  []NMES                    []Other:  []w/US   []w/ice   []w/heat  Position:  Location:    []  Traction: [] Cervical       []Lumbar                       [] Prone          []Supine                       []Intermittent   []Continuous Lbs:  [] before manual  [] after manual    []  Ultrasound: []Continuous   [] Pulsed                           []1MHz   []3MHz W/cm2:  Location:    []  Iontophoresis with dexamethasone         Location: [] Take home patch   [] In clinic    []  Ice     []  heat  []  Ice massage  []  Laser   []  Anodyne Position:  Location:    []  Laser with stim  []  Other:  Position:  Location:   15 [x]  Vasopneumatic Device - to (R) leg and (R) foot/ankle Pressure:       [] lo [x] med [] hi   Temperature: [x] lo [] med [] hi   [] Skin assessment post-treatment:  []intact []redness- no adverse reaction    []redness  adverse reaction:       25 min Therapeutic Exercise:  [x] See flow sheet :    Rationale: increase ROM, increase strength, improve coordination, improve balance and increase proprioception to improve the patients ability to ambulate with normal gait pattern     15 min Manual Therapy: Technique:      [x] S/DTM []IASTM [x]PROM [] Passive Stretching   [x] Graston:  [] GT 1  [] GT 2 [] GT 3 [x] GT 4 [] GT 4 [] GT 5  [] GT 6  [] Sweep [] Fan [] [place]  [] Brush   []  Swivel []J- Stroke [] Scoop []IFraming     []Manual TPR  [] TDN (see objective; actual needle insertion time not billed)  []Jt manipulation:Gr I [] II []  III [] IV[] V[]  Treatment/Area: DTM to R Plantar fascia, and PROM to the R ankle    Rationale:      decrease pain, increase ROM, increase tissue extensibility and decrease trigger points to improve patient's ability to ambulated and return to normal ADLs       With   [] TE   [] TA   [] neuro   [] other: Patient Education: [x] Review HEP    [] Progressed/Changed HEP based on:   [] positioning   [] body mechanics   [] transfers   [] heat/ice application    [] Graston Education: Explained the effects and benefits of Graston Technique therapy including potential for post treatment soreness and bruising. [] Other:           Objective/Functional Measures with Therapist Assessment Noted with Response to Therapy Session[de-identified]  Patient is slow with progression with increasing weight bearing - continues to use 2 crutches     ASSESSMENT/Changes in Function:     Patient will continue to benefit from skilled PT services to modify and progress therapeutic interventions, address functional mobility deficits, address ROM deficits, address strength deficits, analyze and modify body mechanics/ergonomics, assess and modify postural abnormalities and instruct in home and community integration to attain remaining goals. Progress towards goals / Updated goals: · Short Term Goals: To be accomplished in  2  weeks:  1.  Pt will be IND and compliant with HEP for self-management of symptoms. progressing, 5/4/18  2. Pt will progress to ambulation in CAM boot and 1 crutch to progress towards IND ambulation. Limited 5/4/18  · Long Term Goals: To be accomplished in  4  weeks:  1. Pt will improve R ankle ROM = to the L to improve gait mechanics. 2. Pt will improve R ankle strength to 5/5 to be able to ambulate without AD demonstrating normalized gait on level/unlevel surfaces. 3. Pt will be able to perform SLS on the R LE for 30 sec to improve proprioception and SL balance. 4. Pt will improve FOTO score to at least 59/100 as a functional indicator of improved mobility.      PLAN  [x]  Upgrade activities as tolerated     [x]  Continue plan of care  []  Update interventions per flow sheet       []  Discharge due to:_  []  Other:_      Akua Brown PTA 5/11/2018  350 PM    Future Appointments  Date Time Provider Twila Hale   5/15/2018 9:00 AM Akua Brown PTA [facility]   5/17/2018 8:30 AM Nacho Hendrix DPT [facility]   5/22/2018 9:00 AM Akua Brown PTA [facility]   5/23/2018 3:00 PM Miguel Irvin PA-C [facility]   5/24/2018 9:00 AM Nacho Hendrix DPT [facility]   5/29/2018 9:00 AM Nacho Hendrix DPT [facility]   5/31/2018 9:00 AM Nacho Hendrix DPT [facility]

## 2018-05-15 ENCOUNTER — APPOINTMENT (OUTPATIENT)
Dept: PHYSICAL THERAPY | Age: 48
End: 2018-05-15
Payer: COMMERCIAL

## 2018-05-17 ENCOUNTER — HOSPITAL ENCOUNTER (OUTPATIENT)
Dept: PHYSICAL THERAPY | Age: 48
Discharge: HOME | End: 2018-05-17
Payer: COMMERCIAL

## 2018-05-17 PROCEDURE — 97140 MANUAL THERAPY 1/> REGIONS: CPT | Performed by: PHYSICAL THERAPIST

## 2018-05-17 PROCEDURE — 97016 VASOPNEUMATIC DEVICE THERAPY: CPT | Performed by: PHYSICAL THERAPIST

## 2018-05-17 PROCEDURE — 97110 THERAPEUTIC EXERCISES: CPT | Performed by: PHYSICAL THERAPIST

## 2018-05-17 NOTE — PROGRESS NOTES
PT DAILY TREATMENT NOTE     Patient Name: Binh Tejeda  Date:2018  : 1970  [x]  Patient  Verified  Payor: Melanie Dunbar / Plan: Wong Renee / Product Type: HMO /    In time: 1 Out time:949  Total Treatment Time (min): 78  Visit #: 6 of     Treatment Area: Fracture of tibia [B20.144K]    SUBJECTIVE  Pain Level IN: (0-10 scale): 2-3/10   Pain Level OUT: (0-10 scale) post treatment: 0/10    Any medication changes, allergies to medications, adverse drug reactions, diagnosis change, or new procedure performed?: [x] No    [] Yes (see summary sheet for update)  Subjective functional status/changes:   [] No changes reported  Pt reports putting weight through the R LE 50% of the time a couple times a day. \"I try to walk correctly but not often, and in the shower.  I think my ankle hurts from putting too much weight on it while in the shower\"    OBJECTIVE    Modality rationale: decrease edema, decrease inflammation and decrease pain to improve the patients ability to weight bear into the (R) leg    Min Type Additional Details    [] Estim:  []Unatt       []IFC  []Premod                        []Other:  []w/ice   []w/heat  Position:  Location:    [] Estim: []Att    []TENS instruct  []NMES                    []Other:  []w/US   []w/ice   []w/heat  Position:  Location:    []  Traction: [] Cervical       []Lumbar                       [] Prone          []Supine                       []Intermittent   []Continuous Lbs:  [] before manual  [] after manual    []  Ultrasound: []Continuous   [] Pulsed                           []1MHz   []3MHz W/cm2:  Location:    []  Iontophoresis with dexamethasone         Location: [] Take home patch   [] In clinic    []  Ice     []  heat  []  Ice massage  []  Laser   []  Anodyne Position:  Location:    []  Laser with stim  []  Other:  Position:  Location:   15 [x]  Vasopneumatic Device - to (R) leg and (R) foot/ankle Pressure:       [] lo [x] med [] hi   Temperature: [x] lo [] med [] hi   [] Skin assessment post-treatment:  []intact []redness- no adverse reaction    []redness  adverse reaction:       49 min Therapeutic Exercise:  [x] See flow sheet :  Added standing weight-shifts   Rationale: increase ROM, increase strength, improve coordination, improve balance and increase proprioception to improve the patients ability to ambulate with normal gait pattern     15 min Manual Therapy: Technique:      [x] S/DTM []IASTM [x]PROM [] Passive Stretching   [x] Graston:  [] GT 1  [] GT 2 [x] GT 3 [x] GT 4 [] GT 4 [] GT 5  [] GT 6  [] Sweep [] Fan [] [place]  [] Brush   []  Swivel []J- Stroke [] Scoop []IFraming     []Manual TPR  [] TDN (see objective; actual needle insertion time not billed)  []Jt manipulation:Gr I [] II []  III [] IV[] V[]  Treatment/Area: DTM to R Gastroc/Peroneals PROM to the R ankle   Rationale:      decrease pain, increase ROM, increase tissue extensibility and decrease trigger points to improve patient's ability to ambulated and return to normal ADLs       With   [] TE   [] TA   [] neuro   [x] other: Patient Education: [x] Review HEP    [] Progressed/Changed HEP based on:   [] positioning   [] body mechanics   [] transfers   [] heat/ice application    [] Graston Education: Explained the effects and benefits of Graston Technique therapy including potential for post treatment soreness and bruising. [x] Other: standing weight shifts forward<> back, side to side           Objective/Functional Measures with Therapist Assessment Noted with Response to Therapy Session[de-identified]  Pt applied ~50% weight with weight shifts in the bars  Noted tightness in R Gastroc/peroneals      ASSESSMENT/Changes in Function:   Pain/fear continues to limit weight-bearing through the R LE. Added weight shifts in // to address this. Pt reported decreased pain in the R ankle after manual interventions secondary to increased flexibility of the R Gastroc/peronals. Continue per current POC.      Patient will continue to benefit from skilled PT services to modify and progress therapeutic interventions, address functional mobility deficits, address ROM deficits, address strength deficits, analyze and modify body mechanics/ergonomics, assess and modify postural abnormalities and instruct in home and community integration to attain remaining goals. Progress towards goals / Updated goals: · Short Term Goals: To be accomplished in  2  weeks:  1. Pt will be IND and compliant with HEP for self-management of symptoms. progressing, 5/4/18  2. Pt will progress to ambulation in CAM boot and 1 crutch to progress towards IND ambulation. Limited 5/17/18  · Long Term Goals: To be accomplished in  4  weeks:  1. Pt will improve R ankle ROM = to the L to improve gait mechanics. 2. Pt will improve R ankle strength to 5/5 to be able to ambulate without AD demonstrating normalized gait on level/unlevel surfaces. 3. Pt will be able to perform SLS on the R LE for 30 sec to improve proprioception and SL balance. 4. Pt will improve FOTO score to at least 59/100 as a functional indicator of improved mobility.      PLAN  [x]  Upgrade activities as tolerated     [x]  Continue plan of care  []  Update interventions per flow sheet       []  Discharge due to:_  [x]  Other: check ROM goals next session     Lillian López DPT 5/17/2018  350 PM    Future Appointments  Date Time Provider Twila Hale   5/22/2018 9:00 AM Gabriela Cid, PTA [facility]   5/23/2018 3:00 PM CARLO Contreras 67   5/24/2018 9:00 AM Lillian López DPT [facility]   5/29/2018 9:00 AM Lillian López DPT [facility]   5/31/2018 9:00 AM Lillian López DPT [facility]

## 2018-05-22 ENCOUNTER — HOSPITAL ENCOUNTER (OUTPATIENT)
Dept: PHYSICAL THERAPY | Age: 48
Discharge: HOME | End: 2018-05-22
Payer: COMMERCIAL

## 2018-05-22 PROCEDURE — 97016 VASOPNEUMATIC DEVICE THERAPY: CPT

## 2018-05-22 PROCEDURE — 97110 THERAPEUTIC EXERCISES: CPT

## 2018-05-22 PROCEDURE — 97140 MANUAL THERAPY 1/> REGIONS: CPT

## 2018-05-22 NOTE — PROGRESS NOTES
PT DAILY TREATMENT NOTE     Patient Name: Malorie Soto  Date:2018  : 1970  [x]  Patient  Verified  Payor: Lanier Rubinstein / Plan: Kaylin Clifford / Product Type: HMO /    In time: 8:55 Out time:10:03  Total Treatment Time (min): 76  Visit #: 7 of     Treatment Area: Fracture of tibia [B27.866B]    SUBJECTIVE  Pain Level IN: (0-10 scale): 1-2/10   Pain Level OUT: (0-10 scale) post treatment: 0/10    Any medication changes, allergies to medications, adverse drug reactions, diagnosis change, or new procedure performed?: [x] No    [] Yes (see summary sheet for update)  Subjective functional status/changes:   [] No changes reported  I am still not able to get rid of one of my crutches it just hurts too bad over the inside of my ankle and into the leg     OBJECTIVE    Modality rationale: decrease edema, decrease inflammation and decrease pain to improve the patients ability to weight bear into the (R) leg    Min Type Additional Details    [] Estim:  []Unatt       []IFC  []Premod                        []Other:  []w/ice   []w/heat  Position:  Location:    [] Estim: []Att    []TENS instruct  []NMES                    []Other:  []w/US   []w/ice   []w/heat  Position:  Location:    []  Traction: [] Cervical       []Lumbar                       [] Prone          []Supine                       []Intermittent   []Continuous Lbs:  [] before manual  [] after manual    []  Ultrasound: []Continuous   [] Pulsed                           []1MHz   []3MHz W/cm2:  Location:    []  Iontophoresis with dexamethasone         Location: [] Take home patch   [] In clinic    []  Ice     []  heat  []  Ice massage  []  Laser   []  Anodyne Position:  Location:    []  Laser with stim  []  Other:  Position:  Location:   15 [x]  Vasopneumatic Device - to (R) leg and (R) foot/ankle Pressure:       [] lo [x] med [] hi   Temperature: [x] lo [] med [] hi   [] Skin assessment post-treatment:  []intact []redness- no adverse reaction    []redness  adverse reaction:       38/33 min Therapeutic Exercise:  [x] See flow sheet : 5 min NC for warm up   Added t-band 4 way ankle with orange t-band with no increase of pain    Rationale: increase ROM, increase strength, improve coordination, improve balance and increase proprioception to improve the patients ability to ambulate with normal gait pattern     15 min Manual Therapy: Technique:      [x] S/DTM []IASTM [x]PROM [x] Passive Stretching   [] Graston:  [] GT 1  [] GT 2 [] GT 3 [x] GT 4 [] GT 4 [] GT 5  [] GT 6  [] Sweep [] Fan [] [place]  [] Brush   []  Swivel []J- Stroke [] Scoop []IFraming     []Manual TPR  [] TDN (see objective; actual needle insertion time not billed)  [x]Jt manipulation:Gr I [x] II []  III [] IV[] V[]  Treatment/Area: in supine and prone to increase ankle DF and PF, inversion/eversion     Rationale:      decrease pain, increase ROM, increase tissue extensibility and decrease trigger points to improve patient's ability to ambulated and return to normal ADLs       With   [] TE   [] TA   [] neuro   [] other: Patient Education: [x] Review HEP    [] Progressed/Changed HEP based on:   [] positioning   [] body mechanics   [] transfers   [] heat/ice application    [] Graston Education: Explained the effects and benefits of Graston Technique therapy including potential for post treatment soreness and bruising. [] Other:           Objective/Functional Measures with Therapist Assessment Noted with Response to Therapy Session[de-identified]  practiced weight shifts in the parallel bars - however continues to cause increase pain to lateral and medial joint area.  Had patient add air to his CAM boot and patient was able to tolerate increase weight bearing with some decrease in reports of pain  Patient was able to tolerated resisted t-band exercises - ankle 4 way with no increase pain  Increase tightness felt and noted in all direction of the ankle and min active motion with ankle ankle inversion and eversion       ASSESSMENT/Changes in Function:     Patient will continue to benefit from skilled PT services to modify and progress therapeutic interventions, address functional mobility deficits, address ROM deficits, address strength deficits, analyze and modify body mechanics/ergonomics, assess and modify postural abnormalities and instruct in home and community integration to attain remaining goals. Progress towards goals / Updated goals: · Short Term Goals: To be accomplished in  2  weeks:  1. Pt will be IND and compliant with HEP for self-management of symptoms. progressing, 5/4/18  2. Pt will progress to ambulation in CAM boot and 1 crutch to progress towards IND ambulation. Limited 5/4/18  · Long Term Goals: To be accomplished in  4  weeks:  1. Pt will improve R ankle ROM = to the L to improve gait mechanics. 2. Pt will improve R ankle strength to 5/5 to be able to ambulate without AD demonstrating normalized gait on level/unlevel surfaces. 3. Pt will be able to perform SLS on the R LE for 30 sec to improve proprioception and SL balance. 4. Pt will improve FOTO score to at least 59/100 as a functional indicator of improved mobility.      PLAN  [x]  Upgrade activities as tolerated     [x]  Continue plan of care  []  Update interventions per flow sheet       []  Discharge due to:_  []  Other:_      Missael Torres, PTA 5/22/2018  350 PM    Future Appointments  Date Time Provider Twila Hale   5/23/2018 3:00 PM CARLO Alfaro 67   5/24/2018 9:00 AM Pavel Muñoz DPT [facility]   5/29/2018 9:00 AM Pavel Muñoz DPT [facility]   5/31/2018 9:00 AM Pavel Muñoz DPT [facility]

## 2018-05-23 ENCOUNTER — OFFICE VISIT (OUTPATIENT)
Dept: ORTHOPEDIC SURGERY | Facility: CLINIC | Age: 48
End: 2018-05-23

## 2018-05-23 VITALS
SYSTOLIC BLOOD PRESSURE: 117 MMHG | HEIGHT: 75 IN | HEART RATE: 84 BPM | RESPIRATION RATE: 18 BRPM | OXYGEN SATURATION: 99 % | TEMPERATURE: 97.1 F | DIASTOLIC BLOOD PRESSURE: 83 MMHG

## 2018-05-23 DIAGNOSIS — Z87.81 H/O FRACTURE OF TIBIA: ICD-10-CM

## 2018-05-23 DIAGNOSIS — Z87.81: ICD-10-CM

## 2018-05-23 DIAGNOSIS — S82.251D CLOSED DISPLACED COMMINUTED FRACTURE OF SHAFT OF RIGHT TIBIA WITH ROUTINE HEALING, SUBSEQUENT ENCOUNTER: Primary | ICD-10-CM

## 2018-05-23 NOTE — TELEPHONE ENCOUNTER
Spoke with patient informed him his forms are ready to be picked up at the [facility] office after 12 pm.

## 2018-05-23 NOTE — PROGRESS NOTES
Patient: Estee Campos                MRN: [id number]       SSN: [id number]  YOB: 1970        AGE: 50 y.o. SEX: male  There is no height or weight on file to calculate BMI. PCP: Leanna Person NP  05/23/18 05/23/18: Wearing boot, and crutches, pain lateral ankle, WBAT. Working from [facility]. In PT current. 05/02/18: Patient just started PT OP, with boot he is 2/3 weight bearing. Using crutches for ambulation. Pain improved. Chief Complaint: No changes from previous visit complaint. HISTORY OF PRESENT ILLNESS: Mr. Parviz Rivero returns today for follow-up of his right tib/fib fracture. He is still having some mobility issues. He is using his crutch and is getting around better. He continues to report some pain in the right knee as well as some pain in the right tibia area. He has not had any falls or other injuries. Past Medical History:   Diagnosis Date    Thyroid disease        History reviewed. No pertinent family history. Current Outpatient Prescriptions   Medication Sig Dispense Refill    acetaminophen (TYLENOL) 325 mg tablet Take  by mouth every four (4) hours as needed for Pain.  levothyroxine (SYNTHROID) 75 mcg tablet Take  by mouth Daily (before breakfast).  ibuprofen (MOTRIN) 600 mg tablet Take 800 mg by mouth every six (6) hours as needed for Pain.  oxyCODONE-acetaminophen (PERCOCET) 7.5-325 mg per tablet Take 1-2 Tabs by mouth every six (6) hours as needed for Pain. Max Daily Amount: 8 Tabs. 40 Tab 0    oxyCODONE IR (ROXICODONE) 5 mg immediate release tablet TAKE 1 TO 2 TABLETS BY MOUTH EVERY 4 HOURS AS NEEDED 50 Tab 0    apixaban (ELIQUIS) 2.5 mg tablet Take 2.5 mg by mouth two (2) times a day. No Known Allergies    History reviewed. No pertinent surgical history.     Social History     Social History    Marital status:      Spouse name: N/A    Number of children: N/A    Years of education: N/A     Occupational History    Not on file. Social History Main Topics    Smoking status: Former Smoker    Smokeless tobacco: Never Used    Alcohol use No    Drug use: No    Sexual activity: Not on file     Other Topics Concern    Not on file     Social History Narrative       REVIEW OF SYSTEMS:      No changes from previous review of systems unless noted. PHYSICAL EXAMINATION:  Visit Vitals    /83    Pulse 84    Temp 97.1 °F (36.2 °C) (Oral)    Resp 18    Ht 6' 3\" (1.905 m)    SpO2 99%     There is no height or weight on file to calculate BMI. GENERAL: Alert and oriented x3, in no acute distress. HEENT: Normocephalic, atraumatic. RESP: Non labored breathing. SKIN: No rashes or lesions noted. PHYSICAL EXAMINATION:  Physical examination of the right leg reveals healed surgical incisions. There is some mild swelling about the distal tibia as well as the foot and ankle on the right side. There is no erythema or drainage. There is no calf tenderness to palpation. He is neurovascularly intact distally. Trace point tender lateral inferior malleolus. Plantar flexion (P) 5 degrees and (P) Dorsiflexion 6 degrees. (-) anterior draw sign. RADIOGRAPHS:  X-rays, two views of the right tibia/fibula were taken today. They show continued  interval healing of the tibia fracture with a nail in place and good alignment and position. There is also acceptable alignment of comminuted fibula fracture as previously noted. Moderate callous inlay associated with fibular fracture site. ASSESSMENT/PLAN:  Mr. David Summers is a 64-year-old male now three weeks out from his right tib/fib fracture and tibia IM nail. Continue PT advance to full weight bearing without boot and dc crutches. We will see him back in three weeks. X rays reviewed and all questions answered to his satisfaction.           Electronically signed by: Jeffrey Boyle PA-C

## 2018-05-24 ENCOUNTER — HOSPITAL ENCOUNTER (OUTPATIENT)
Dept: PHYSICAL THERAPY | Age: 48
Discharge: HOME | End: 2018-05-24
Payer: COMMERCIAL

## 2018-05-24 PROCEDURE — 97016 VASOPNEUMATIC DEVICE THERAPY: CPT | Performed by: PHYSICAL THERAPIST

## 2018-05-24 PROCEDURE — 97110 THERAPEUTIC EXERCISES: CPT | Performed by: PHYSICAL THERAPIST

## 2018-05-24 PROCEDURE — 97140 MANUAL THERAPY 1/> REGIONS: CPT | Performed by: PHYSICAL THERAPIST

## 2018-05-24 NOTE — PROGRESS NOTES
PT DAILY TREATMENT NOTE     Patient Name: Faiza Brain  Date:2018  : 1970  [x]  Patient  Verified  Payor: Bhupinder Rooney / Plan: Brigitte Gong / Product Type: HMO /    In time: 017 Out time:1025  Total Treatment Time (min): 80  Visit #: 8 of     Treatment Area: Fracture of tibia [Y41.814S]    SUBJECTIVE  Pain Level IN: (0-10 scale): 1/10   Pain Level OUT: (0-10 scale) post treatment: 0/10    Any medication changes, allergies to medications, adverse drug reactions, diagnosis change, or new procedure performed?: [x] No    [] Yes (see summary sheet for update)  Subjective functional status/changes:   [] No changes reported  Pt reports 50% improvement since SOC. He reports improvements in ROM, decreased pain, and overall weight-bearing through the R LE. Functional limitations include % weight through the R LE. All standing activities. He notes that he saw MD yesterday and he DC'd boot to ankle brace.      OBJECTIVE    Modality rationale: decrease edema, decrease inflammation and decrease pain to improve the patients ability to weight bear into the (R) leg    Min Type Additional Details    [] Estim:  []Unatt       []IFC  []Premod                        []Other:  []w/ice   []w/heat  Position:  Location:    [] Estim: []Att    []TENS instruct  []NMES                    []Other:  []w/US   []w/ice   []w/heat  Position:  Location:    []  Traction: [] Cervical       []Lumbar                       [] Prone          []Supine                       []Intermittent   []Continuous Lbs:  [] before manual  [] after manual    []  Ultrasound: []Continuous   [] Pulsed                           []1MHz   []3MHz W/cm2:  Location:    []  Iontophoresis with dexamethasone         Location: [] Take home patch   [] In clinic    []  Ice     []  heat  []  Ice massage  []  Laser   []  Anodyne Position:  Location:    []  Laser with stim  []  Other:  Position:  Location:   15 [x]  Vasopneumatic Device - to (R) leg and (R) foot/ankle Pressure:       [] lo [x] med [] hi   Temperature: [x] lo [] med [] hi   [] Skin assessment post-treatment:  []intact []redness- no adverse reaction    []redness  adverse reaction:       50 min Therapeutic Exercise:  [x] See flow sheet : PT reassessment    Rationale: increase ROM, increase strength, improve coordination, improve balance and increase proprioception to improve the patients ability to ambulate with normal gait pattern     15 min Manual Therapy: Technique:      [x] S/DTM []IASTM [x]PROM [x] Passive Stretching   [x] Graston:  [] GT 1  [] GT 2 [x] GT 3 [x] GT 4 [] GT 4 [] GT 5  [] GT 6  [x] Sweep [x] Fan [x] [place]  [x] Brush   [x]  Swivel []J- Stroke [] Scoop []IFraming     []Manual TPR  [] TDN (see objective; actual needle insertion time not billed)  [x]Jt manipulation:Gr I [] II []  III [x] IV[] V[]  Treatment/Area: in supine and prone to increase ankle DF and PF, inversion/eversion     Rationale:      decrease pain, increase ROM, increase tissue extensibility and decrease trigger points to improve patient's ability to ambulate and return to normal ADLs       With   [] TE   [] TA   [] neuro   [] other: Patient Education: [x] Review HEP    [] Progressed/Changed HEP based on:   [] positioning   [] body mechanics   [] transfers   [] heat/ice application    [] Graston Education: Explained the effects and benefits of Graston Technique therapy including potential for post treatment soreness and bruising. [] Other:        Objective/Functional Measures with Therapist Assessment Noted with Response to Therapy Session:  Gait: Pt ambulates into clinic without CAM boot/shoe with ankle brace on the R LE.  He is able to ambulate with 1 axillary crutch on the L UE demonstrating decreased heel toe pattern on the R with decreased step length on the L  AROM of the R ankle (pre manual): DF: -10 deg, PF: 50 deg, IN: 20 deg, EV: 2 deg  AROM of the R ankle post manual: DF: 0 deg, PF: 62 deg  R ankle strength ~2+/5 grossly into DF, EV/IN 3/5 with pain, PF not assessed secondary to inability to perform full weight-bearing on the R  FOTO: 43/100      ASSESSMENT/Changes in Function:   Upon reassessment, pt continues to present with limitations in AROM of the R ankle/strength as we as decreased tolerance to full weight-bearing on the R LE limiting overall functional mobility and activity tolerance. Pt presents with R ankle brace without CAM boot reporting increased stability. Pt educated to only use 1 axillary crutch at home to improve weight-bearing tolerance. Noted tightness throughout the R lower leg limiting full ROM however, when tolerance to weight-bearing improves this will also improve. Patient will continue to benefit from skilled PT services to modify and progress therapeutic interventions, address functional mobility deficits, address ROM deficits, address strength deficits, analyze and modify body mechanics/ergonomics, assess and modify postural abnormalities and instruct in home and community integration to attain remaining goals. Progress towards goals / Updated goals: · Short Term Goals: To be accomplished in  2  weeks:  1. Pt will be IND and compliant with HEP for self-management of symptoms. progressing, 5/24/18  2. Pt will progress to ambulation in CAM boot and 1 crutch to progress towards IND ambulation. Progressing 5/24/18  · Long Term Goals: To be accomplished in  4  weeks:  1. Pt will improve R ankle ROM = to the L to improve gait mechanics. Progressing 5/24/18  2. Pt will improve R ankle strength to 5/5 to be able to ambulate without AD demonstrating normalized gait on level/unlevel surfaces. progressing 5/24/18  3. Pt will be able to perform SLS on the R LE for 30 sec to improve proprioception and SL balance. NT secondary to poor tolerance to full WB 5/24/18  4. Pt will improve FOTO score to at least 59/100 as a functional indicator of improved mobility.   Progressing 5/24/18    PLAN  [x] Upgrade activities as tolerated     [x]  Continue plan of care  []  Update interventions per flow sheet       []  Discharge due to:_  [x]  Other:Continue PT 2-3x/week for 4 weeks to progress towards [address] Danny Haywood DPT 5/24/2018  1026 AM    Future Appointments  Date Time Provider Twila Hale   5/29/2018 9:00 AM Seth Cortez DPT [facility]   5/31/2018 9:00 AM Seth Cortez DPT [facility]   6/12/2018 2:30 PM Rohit Gaitan MD [facility] 69

## 2018-05-24 NOTE — PROGRESS NOTES
[address] [facility] AT [facility]  [address] [facility] [address] [address],[address], [place], [place]  Phone [phone]  Fax [phone]  PROGRESS NOTE  Patient Name: Maryjane Aiken : 1970   Treatment/Medical Diagnosis: Fracture of tibia [S82.209A]   Referral Source: Leatha Helton MD     Date of Initial Visit: 18 Attended Visits: 8 Missed Visits: 0     SUMMARY OF TREATMENT  Pt seen for 8 therapy sessions for R tibial ORIF on 3/21/18. Therapy has included therex for strengthening/stability, neuro re-education for balance/weight-bearing education, gait training, manual interventions to improve ROM/tissue extensibility, and modalities for pain management. CURRENT STATUS  Pt reports 50% improvement since [facility]. He reports improvements in ROM, decreased pain, and overall weight-bearing through the R LE. Functional limitations include % weight through the R LE. All standing activities. He notes that he saw MD yesterday and he DC'd boot to ankle brace. Upon reassessment, pt continues to present with limitations in AROM of the R ankle/strength as we as decreased tolerance to full weight-bearing on the R LE limiting overall functional mobility and activity tolerance. Pt presents with R ankle brace without CAM boot reporting increased stability. Pt educated to only use 1 axillary crutch at home to improve weight-bearing tolerance. Noted tightness throughout the R lower leg limiting full ROM however, when tolerance to weight-bearing improves this will also improve. Patient will continue to benefit from skilled PT services to modify and progress therapeutic interventions, address functional mobility deficits, address ROM deficits, address strength deficits, analyze and modify body mechanics/ergonomics, assess and modify postural abnormalities and instruct in home and community integration to attain remaining goals.     Gait: Pt ambulates into clinic without CAM boot/shoe with ankle brace on the R LE. He is able to ambulate with 1 axillary crutch on the L UE demonstrating decreased heel toe pattern on the R with decreased step length on the L  AROM of the R ankle (pre manual): DF: -10 deg, PF: 50 deg, IN: 20 deg, EV: 2 deg  AROM of the R ankle post manual: DF: 0 deg, PF: 62 deg  R ankle strength ~2+/5 grossly into DF, EV/IN 3/5 with pain, PF not assessed secondary to inability to perform full weight-bearing on the R  FOTO: 43/100    At eval on 4/27/18:  ROM/Strength  [] Unable to assess at this time                                                                     AROM                             PROM                          Strength (1-5)     Left Right Left Right Left  Right   Dorsiflexion 10 -20       5 NT        Plantarflexion 50 40       5 NT        Inversion 32 20       5 NT        Eversion 10 10       5 NT         Goal/Measure of Progress Goal Met? · Short Term Goals: To be accomplished in  2  weeks:  1. Pt will be IND and compliant with HEP for self-management of symptoms. progressing, 5/24/18  2. Pt will progress to ambulation in CAM boot and 1 crutch to progress towards IND ambulation. Progressing 5/24/18  · Long Term Goals: To be accomplished in  4  weeks:  1. Pt will improve R ankle ROM = to the L to improve gait mechanics. Progressing 5/24/18  2. Pt will improve R ankle strength to 5/5 to be able to ambulate without AD demonstrating normalized gait on level/unlevel surfaces. progressing 5/24/18  3. Pt will be able to perform SLS on the R LE for 30 sec to improve proprioception and SL balance. NT secondary to poor tolerance to full WB 5/24/18  4. Pt will improve FOTO score to at least 59/100 as a functional indicator of improved mobility. Progressing 5/24/18     New Goals to be achieved in __4__  weeks:  1. Pt will improve R ankle ROM = to the L to improve gait mechanics.    2. Pt will improve R ankle strength to 5/5 to be able to ambulate without AD demonstrating normalized gait on level/unlevel surfaces  3. Pt will be able to perform SLS on the R LE for 30 sec to improve proprioception and SL balance. 4. Pt will improve FOTO score to at least 59/100 as a functional indicator of improved mobility. RECOMMENDATIONS  Continue PT 2-3x/week for 4 weeks to progress towards LTGs    If you have any questions/comments please contact us directly at [phone]. Thank you for allowing us to assist in the care of your patient. Therapist Signature: Justo Jeffers DPT Date: 5/24/2018     Time: 10:27 AM   NOTE TO PHYSICIAN:  PLEASE COMPLETE THE ORDERS BELOW AND FAX TO   [facility]: [phone]. If you are unable to process this request in 24 hours please contact our office: [phone].    ___ I have read the above report and request that my patient continue as recommended.   ___ I have read the above report and request that my patient continue therapy with the following changes/special instructions:_________________________________________________________   ___ I have read the above report and request that my patient be discharged from therapy.      Physician Signature:        Date:       Time:

## 2018-05-29 ENCOUNTER — HOSPITAL ENCOUNTER (OUTPATIENT)
Dept: PHYSICAL THERAPY | Age: 48
Discharge: HOME | End: 2018-05-29
Payer: COMMERCIAL

## 2018-05-29 ENCOUNTER — TELEPHONE (OUTPATIENT)
Dept: ORTHOPEDIC SURGERY | Facility: CLINIC | Age: 48
End: 2018-05-29

## 2018-05-29 DIAGNOSIS — S82.251D CLOSED DISPLACED COMMINUTED FRACTURE OF SHAFT OF RIGHT TIBIA WITH ROUTINE HEALING, SUBSEQUENT ENCOUNTER: ICD-10-CM

## 2018-05-29 DIAGNOSIS — S82.451D CLOSED DISPLACED COMMINUTED FRACTURE OF SHAFT OF RIGHT FIBULA WITH ROUTINE HEALING, SUBSEQUENT ENCOUNTER: ICD-10-CM

## 2018-05-29 PROCEDURE — 97110 THERAPEUTIC EXERCISES: CPT | Performed by: PHYSICAL THERAPIST

## 2018-05-29 PROCEDURE — 97016 VASOPNEUMATIC DEVICE THERAPY: CPT | Performed by: PHYSICAL THERAPIST

## 2018-05-29 PROCEDURE — 97140 MANUAL THERAPY 1/> REGIONS: CPT | Performed by: PHYSICAL THERAPIST

## 2018-05-29 NOTE — PROGRESS NOTES
PT DAILY TREATMENT NOTE     Patient Name: Roman Naranjo  Date:2018  : 1970  [x]  Patient  Verified  Payor: Carlos Jones / Plan: Jennifer Ferrell / Product Type: HMO /    In time: 0 Out time:1007  Total Treatment Time (min): 72  Visit #: 1 of     Treatment Area: Fracture of tibia [M48.073C]    SUBJECTIVE  Pain Level IN: (0-10 scale): 1-2/10   Pain Level OUT: (0-10 scale) post treatment: 0/10    Any medication changes, allergies to medications, adverse drug reactions, diagnosis change, or new procedure performed?: [x] No    [] Yes (see summary sheet for update)  Subjective functional status/changes:   [] No changes reported  Pt reports that he has only been using 1 crutch at home    OBJECTIVE    Modality rationale: decrease edema, decrease inflammation and decrease pain to improve the patients ability to weight bear into the (R) leg    Min Type Additional Details    [] Estim:  []Unatt       []IFC  []Premod                        []Other:  []w/ice   []w/heat  Position:  Location:    [] Estim: []Att    []TENS instruct  []NMES                    []Other:  []w/US   []w/ice   []w/heat  Position:  Location:    []  Traction: [] Cervical       []Lumbar                       [] Prone          []Supine                       []Intermittent   []Continuous Lbs:  [] before manual  [] after manual    []  Ultrasound: []Continuous   [] Pulsed                           []1MHz   []3MHz W/cm2:  Location:    []  Iontophoresis with dexamethasone         Location: [] Take home patch   [] In clinic    []  Ice     []  heat  []  Ice massage  []  Laser   []  Anodyne Position:  Location:    []  Laser with stim  []  Other:  Position:  Location:   15 [x]  Vasopneumatic Device - to (R) leg and (R) foot/ankle Pressure:       [] lo [x] med [] hi   Temperature: [x] lo [] med [] hi   [] Skin assessment post-treatment:  []intact []redness- no adverse reaction    []redness  adverse reaction:       35 min Therapeutic Exercise:  [x] See flow sheet : progressed therex to standing   Rationale: increase ROM, increase strength, improve coordination, improve balance and increase proprioception to improve the patients ability to ambulate with normal gait pattern     15 min Manual Therapy: Technique:      [x] S/DTM []IASTM [x]PROM [x] Passive Stretching   [x] Graston:  [] GT 1  [] GT 2 [x] GT 3 [] GT 4 [] GT 4 [] GT 5  [] GT 6  [x] Sweep [x] Fan [] [place]  [x] Brush   [x]  Swivel []J- Stroke [] Scoop []IFraming     []Manual TPR  [] TDN (see objective; actual needle insertion time not billed)  [x]Jt manipulation:Gr I [] II []  III [x] IV[] V[]  Treatment/Area: in supine and prone to increase ankle DF and PF, inversion/eversion     Rationale:      decrease pain, increase ROM, increase tissue extensibility and decrease trigger points to improve patient's ability to ambulate and return to normal ADLs       With   [] TE   [] TA   [] neuro   [x] other: Patient Education: [x] Review HEP    [] Progressed/Changed HEP based on:   [] positioning   [] body mechanics   [] transfers   [] heat/ice application    [] Graston Education: Explained the effects and benefits of Graston Technique therapy including potential for post treatment soreness and bruising. [x] Other: elevate R LE for 20 min a few times a day to decrease LE swelling       Objective/Functional Measures with Therapist Assessment Noted with Response to Therapy Session:  Gait: Pt ambulates into clinic without CAM boot/shoe with ankle brace on the R LE. He is able to ambulate with 1 axillary crutch on the L UE demonstrating decreased heel toe pattern on the R with decreased step length on the L with increased UE assist on the R   Increased pain on the R foot with standing therex  Increased swelling throughout the R LE      ASSESSMENT/Changes in Function:   Pt continues to demo poor tolerance to full weight-bearing through the R LE secondary to increased pain levels.  Fair tolerance to progressed standing therex. Continue to progress as tolerated to improve overall activity tolerance to wean off crutches and return to PLOF. Patient will continue to benefit from skilled PT services to modify and progress therapeutic interventions, address functional mobility deficits, address ROM deficits, address strength deficits, analyze and modify body mechanics/ergonomics, assess and modify postural abnormalities and instruct in home and community integration to attain remaining goals. Progress towards goals / Updated goals:  1. Pt will improve R ankle ROM = to the L to improve gait mechanics. 2. Pt will improve R ankle strength to 5/5 to be able to ambulate without AD demonstrating normalized gait on level/unlevel surfaces  3. Pt will be able to perform SLS on the R LE for 30 sec to improve proprioception and SL balance. 4. Pt will improve FOTO score to at least 59/100 as a functional indicator of improved mobility.      PLAN  [x]  Upgrade activities as tolerated     [x]  Continue plan of care  []  Update interventions per flow sheet       []  Discharge due to:_  []  Other:assess effects of added standing therex next session    Court Cooper DPT 5/29/2018  1000 AM    Future Appointments  Date Time Provider Twila Hale   5/31/2018 9:00 AM Court Cooper DPT [facility]   6/12/2018 2:30 PM [facility]

## 2018-05-29 NOTE — TELEPHONE ENCOUNTER
Pt spouse called for status update:    Faxed/scanned forms were found under the media tab. Pt spouse aware confirmed rec'vd, 4 pages  faxed to [phone]  By Munir Price in records at h/s loc. at 2:19pm today.     No further action required

## 2018-05-30 RX ORDER — OXYCODONE AND ACETAMINOPHEN 7.5; 325 MG/1; MG/1
1-2 TABLET ORAL
Qty: 30 TAB | Refills: 0 | Status: SHIPPED | OUTPATIENT
Start: 2018-05-30 | End: 2018-06-11 | Stop reason: SDUPTHER

## 2018-05-30 NOTE — TELEPHONE ENCOUNTER
From: Dluce Osorio  To: Yossi Hatfield NP  Sent: 5/29/2018 10:05 AM EDT  Subject: Medication Renewal Request    Original authorizing provider: GRAHAM Candelaria would like a refill of the following medications:  oxyCODONE-acetaminophen (PERCOCET) 7.5-325 mg per tablet Yossi Hatfield NP]    Preferred pharmacy: Other - [facility]    Comment:  One last refill to get through PT. If this is a problem no worries.  Thank you

## 2018-05-31 ENCOUNTER — HOSPITAL ENCOUNTER (OUTPATIENT)
Dept: PHYSICAL THERAPY | Age: 48
Discharge: HOME | End: 2018-05-31
Payer: COMMERCIAL

## 2018-05-31 ENCOUNTER — TELEPHONE (OUTPATIENT)
Dept: ORTHOPEDIC SURGERY | Facility: CLINIC | Age: 48
End: 2018-05-31

## 2018-05-31 PROCEDURE — 97140 MANUAL THERAPY 1/> REGIONS: CPT | Performed by: PHYSICAL THERAPIST

## 2018-05-31 PROCEDURE — 97014 ELECTRIC STIMULATION THERAPY: CPT | Performed by: PHYSICAL THERAPIST

## 2018-05-31 PROCEDURE — 97110 THERAPEUTIC EXERCISES: CPT | Performed by: PHYSICAL THERAPIST

## 2018-05-31 NOTE — TELEPHONE ENCOUNTER
Spoke with Cristine--pt not progressing well, poor pain tolerance, using lace up brace but still using crutches--she has tried cane--spoke with Mesfin--have pt progress as tolerated--Cristine informed

## 2018-05-31 NOTE — PROGRESS NOTES
PT DAILY TREATMENT NOTE     Patient Name: Tati Chance  Date:2018  : 1970  [x]  Patient  Verified  Payor: Jodie Shaw / Plan: Fortunato Palm / Product Type: HMO /    In time: 0 Out time:1005  Total Treatment Time (min): 70  Visit #: 2 of     Treatment Area: Fracture of tibia [B90.426T]    SUBJECTIVE  Pain Level IN: (0-10 scale): 2/10   Pain Level OUT: (0-10 scale) post treatment: 2/10    Any medication changes, allergies to medications, adverse drug reactions, diagnosis change, or new procedure performed?: [x] No    [] Yes (see summary sheet for update)  Subjective functional status/changes:   [] No changes reported  Pt notes that he has not been using his leg and getting up walking as often as he probably should. Continues to have significant 8/10 pain when he is full weight-bearing. He took 1/2 a pain pill before today's appointment.     OBJECTIVE    Modality rationale: decrease edema, decrease inflammation and decrease pain to improve the patients ability to weight bear into the (R) leg    Min Type Additional Details    [] Estim:  []Unatt       []IFC  []Premod                        []Other:  []w/ice   []w/heat  Position:  Location:    [] Estim: []Att    []TENS instruct  []NMES                    []Other:  []w/US   []w/ice   []w/heat  Position:  Location:    []  Traction: [] Cervical       []Lumbar                       [] Prone          []Supine                       []Intermittent   []Continuous Lbs:  [] before manual  [] after manual    []  Ultrasound: []Continuous   [] Pulsed                           []1MHz   []3MHz W/cm2:  Location:    []  Iontophoresis with dexamethasone         Location: [] Take home patch   [] In clinic    []  Ice     []  heat  []  Ice massage  []  Laser   []  Anodyne Position:  Location:    []  Laser with stim  []  Other:  Position:  Location:   15 [x]  Vasopneumatic Device - to (R) leg and (R) foot/ankle Pressure:       [] lo [x] med [] hi   Temperature: [x] lo [] med [] hi   [] Skin assessment post-treatment:  []intact []redness- no adverse reaction    []redness  adverse reaction:       40 min Therapeutic Exercise:  [x] See flow sheet :    Rationale: increase ROM, increase strength, improve coordination, improve balance and increase proprioception to improve the patients ability to ambulate with normal gait pattern     15 min Manual Therapy: Technique:      [x] S/DTM []IASTM [x]PROM [x] Passive Stretching   [x] Graston:  [] GT 1  [] GT 2 [x] GT 3 [] GT 4 [] GT 4 [] GT 5  [] GT 6  [x] Sweep [x] Fan [] [place]  [x] Brush   [x]  Swivel []J- Stroke [] Scoop []IFraming     []Manual TPR  [] TDN (see objective; actual needle insertion time not billed)  [x]Jt manipulation:Gr I [] II []  III [x] IV[] V[]  Treatment/Area:R LE/ Peroneals/Anterior tib   Rationale:      decrease pain, increase ROM, increase tissue extensibility and decrease trigger points to improve patient's ability to ambulate and return to normal ADLs       With   [] TE   [] TA   [] neuro   [x] other: Patient Education: [x] Review HEP    [] Progressed/Changed HEP based on:   [] positioning   [] body mechanics   [] transfers   [] heat/ice application    [] Graston Education: Explained the effects and benefits of Graston Technique therapy including potential for post treatment soreness and bruising. [x] Other: elevate R LE for 20 min a few times a day to decrease LE swelling       Objective/Functional Measures with Therapist Assessment Noted with Response to Therapy Session:  Pt was able to ambulate with same pattern as crutch using a SPC.  Increased weight through L UE  He was able to take a step without AD demonstrating significant limp with increased pain to 8/10 with shortened step length on the L  He was able to perform 6\" step up on the R with B use of B UE 75%  Increased swelling in the R LE-reduced with manual interventions      ASSESSMENT/Changes in Function:   Pt continues to demo poor tolerance to full weight-bearing through the R LE secondary to increased pain levels. Pt educated at length to perform HEP and to walk more and to take pain pills regularly to improve tolerance to PT as progress is limited due to significant levels with standing. Patient will continue to benefit from skilled PT services to modify and progress therapeutic interventions, address functional mobility deficits, address ROM deficits, address strength deficits, analyze and modify body mechanics/ergonomics, assess and modify postural abnormalities and instruct in home and community integration to attain remaining goals. Progress towards goals / Updated goals:  1. Pt will improve R ankle ROM = to the L to improve gait mechanics. 2. Pt will improve R ankle strength to 5/5 to be able to ambulate without AD demonstrating normalized gait on level/unlevel surfaces progressed to [facility] this session 5/31/18  3. Pt will be able to perform SLS on the R LE for 30 sec to improve proprioception and SL balance. Unable secondary to pain 5/31/18  4. Pt will improve FOTO score to at least 59/100 as a functional indicator of improved mobility.      PLAN  [x]  Upgrade activities as tolerated     [x]  Continue plan of care  []  Update interventions per flow sheet       []  Discharge due to:_  [x]  Other:assess effects of added standing therex next session    Angel Varma DPT 5/31/2018  1222 PM    Future Appointments  Date Time Provider Twila Hale   6/5/2018 9:00 AM Aman Long PTA [facility]   6/8/2018 8:30 AM Aman Long PTA [facility]   6/12/2018 9:00 AM Angel Varma DPT [facility]   6/12/2018 2:30 PM Danyelle Lara MD [facility]   6/15/2018 8:30 AM Aman Long PTA [facility]   6/19/2018 9:00 AM Aman Long PTA [facility]   6/21/2018 9:00 AM Angel Varma DPT [facility]   6/26/2018 9:00 AM Aman Long PTA [facility]   6/29/2018 8:30 AM Aman Long PTA [facility]

## 2018-05-31 NOTE — TELEPHONE ENCOUNTER
Becky from [facility] called requesting to speak with Jaelyn. Becky can be reached at [phone].

## 2018-06-01 ENCOUNTER — TELEPHONE (OUTPATIENT)
Dept: ORTHOPEDIC SURGERY | Facility: CLINIC | Age: 48
End: 2018-06-01

## 2018-06-01 NOTE — TELEPHONE ENCOUNTER
Attempted to contact pt at his home number. Message left for pt to return call. Form has been completed and faxed form with confirmation of fax going through. Awaiting return call at this time.

## 2018-06-01 NOTE — TELEPHONE ENCOUNTER
Attempted to contact pt at his home number. Message left for pt to return call. Pts form was completed by someone else and not by myself. Awaiting return call at this time. Message relayed to patient after she returned call.  Verbalizes understanding.  Recommended to continue rest, elevation of extremity, and heat/ice as comfortable.  Patient will call office if she has any increasing or new symptoms, number provided.

## 2018-06-01 NOTE — TELEPHONE ENCOUNTER
Patient called to outline what corrections are necessary for re-submission. Form is being corrected and faxed as requested today.   Patient requests we call to advise when completed at [phone]

## 2018-06-05 ENCOUNTER — HOSPITAL ENCOUNTER (OUTPATIENT)
Dept: PHYSICAL THERAPY | Age: 48
Discharge: HOME | End: 2018-06-05
Payer: COMMERCIAL

## 2018-06-05 PROCEDURE — 97140 MANUAL THERAPY 1/> REGIONS: CPT

## 2018-06-05 PROCEDURE — 97016 VASOPNEUMATIC DEVICE THERAPY: CPT

## 2018-06-05 NOTE — PROGRESS NOTES
PT DAILY TREATMENT NOTE     Patient Name: Justin Hooper  Date:2018  : 1970  [x]  Patient  Verified  Payor: Edson Dowell / Plan: Julianna Mercedes / Product Type: HMO /    In time: 9:05 Out time:10:20  Total Treatment Time (min): 75  1:1 time = 30  Visit #: 3 of     Treatment Area: Fracture of tibia [E95.220T]    SUBJECTIVE  Pain Level IN: (0-10 scale): 2/10   Pain Level OUT: (0-10 scale) post treatment: 1-210    Any medication changes, allergies to medications, adverse drug reactions, diagnosis change, or new procedure performed?: [x] No    [] Yes (see summary sheet for update)  Subjective functional status/changes:   [] No changes reported  I started taking the pain medication that I still had and I am surprised but it is actually helping     OBJECTIVE    Modality rationale: decrease edema, decrease inflammation and decrease pain to improve the patients ability to weight bear into the (R) leg    Min Type Additional Details    [] Estim:  []Unatt       []IFC  []Premod                        []Other:  []w/ice   []w/heat  Position:  Location:    [] Estim: []Att    []TENS instruct  []NMES                    []Other:  []w/US   []w/ice   []w/heat  Position:  Location:    []  Traction: [] Cervical       []Lumbar                       [] Prone          []Supine                       []Intermittent   []Continuous Lbs:  [] before manual  [] after manual    []  Ultrasound: []Continuous   [] Pulsed                           []1MHz   []3MHz W/cm2:  Location:    []  Iontophoresis with dexamethasone         Location: [] Take home patch   [] In clinic    []  Ice     []  heat  []  Ice massage  []  Laser   []  Anodyne Position:  Location:    []  Laser with stim  []  Other:  Position:  Location:   15 [x]  Vasopneumatic Device - to (R) leg and (R) foot/ankle Pressure:       [] lo [x] med [] hi   Temperature: [x] lo [] med [] hi   [] Skin assessment post-treatment:  []intact []redness- no adverse reaction []redness  adverse reaction:       40 min Therapeutic Exercise:  [x] See flow sheet :    Rationale: increase ROM, increase strength, improve coordination, improve balance and increase proprioception to improve the patients ability to ambulate with normal gait pattern     20 min Manual Therapy: Technique:      [x] S/DTM []IASTM [x]PROM [x] Passive Stretching   [x] Graston:  [] GT 1  [] GT 2 [x] GT 3 [] GT 4 [] GT 4 [] GT 5  [] GT 6  [x] Sweep [x] Fan [] [place]  [x] Brush   [x]  Swivel []J- Stroke [] Scoop []IFraming     []Manual TPR  [] TDN (see objective; actual needle insertion time not billed)  [x]Jt manipulation:Gr I [] II []  III [x] IV[] V[]  Treatment/Area:R LE/ Peroneals/Anterior tib and graston to the plantar surface of the foot    Rationale:      decrease pain, increase ROM, increase tissue extensibility and decrease trigger points to improve patient's ability to ambulate and return to normal ADLs       With   [] TE   [] TA   [] neuro   [x] other: Patient Education: [x] Review HEP    [] Progressed/Changed HEP based on:   [] positioning   [] body mechanics   [] transfers   [] heat/ice application    [] Graston Education: Explained the effects and benefits of Graston Technique therapy including potential for post treatment soreness and bruising.   [x] Other:       Objective/Functional Measures with Therapist Assessment Noted with Response to Therapy Session:  Patient was able to tolerate increase weight bearing and activities/exercise after manual including graston to the plantar surface of the foot and taking pain medication prior to beginning therapy        ASSESSMENT/Changes in Function:    Patient will continue to benefit from skilled PT services to modify and progress therapeutic interventions, address functional mobility deficits, address ROM deficits, address strength deficits, analyze and modify body mechanics/ergonomics, assess and modify postural abnormalities and instruct in home and community integration to attain remaining goals. Progress towards goals / Updated goals:  1. Pt will improve R ankle ROM = to the L to improve gait mechanics. 2. Pt will improve R ankle strength to 5/5 to be able to ambulate without AD demonstrating normalized gait on level/unlevel surfaces progressed to [facility] this session 5/31/18  3. Pt will be able to perform SLS on the R LE for 30 sec to improve proprioception and SL balance. Unable secondary to pain 5/31/18  4. Pt will improve FOTO score to at least 59/100 as a functional indicator of improved mobility.      PLAN  [x]  Upgrade activities as tolerated     [x]  Continue plan of care  []  Update interventions per flow sheet       []  Discharge due to:_  []  Other:    Amy Santiago PTA 6/5/2018  1222 PM    Future Appointments  Date Time Provider Twila Hale   6/8/2018 8:30 AM Amy Santiago PTA [facility]   6/12/2018 9:00 AM Wendi Newton DPT [facility]   6/12/2018 2:30 PM Gaby Junior MD [facility]   6/15/2018 8:30 AM Amy Santiago PTA [facility]   6/19/2018 9:00 AM Amy Santiago PTA [facility]   6/21/2018 9:00 AM Wendi Newton DPT [facility]   6/26/2018 9:00 AM Amy Santiago PTA [facility]   6/29/2018 8:30 AM Amy Santiago PTA [facility]

## 2018-06-06 ENCOUNTER — TELEPHONE (OUTPATIENT)
Dept: ORTHOPEDIC SURGERY | Facility: CLINIC | Age: 48
End: 2018-06-06

## 2018-06-06 NOTE — TELEPHONE ENCOUNTER
Patients dental office called in states that he was pre medicated for today but advised them he had rods and pins placed in his tibia, not by Dr. Bettye Singh but has been following up with Dr. Bettye Singh since 5 days after. They are requesting a letter for future appts so they know how to pre medicate the patient per Dr. Mona Gonzales protocol.  Please fax letter to [phone] and they can be reached by phone at [phone]

## 2018-06-08 ENCOUNTER — HOSPITAL ENCOUNTER (OUTPATIENT)
Dept: PHYSICAL THERAPY | Age: 48
Discharge: HOME | End: 2018-06-08
Payer: COMMERCIAL

## 2018-06-08 PROCEDURE — 97016 VASOPNEUMATIC DEVICE THERAPY: CPT

## 2018-06-08 PROCEDURE — 97110 THERAPEUTIC EXERCISES: CPT

## 2018-06-08 PROCEDURE — 97140 MANUAL THERAPY 1/> REGIONS: CPT

## 2018-06-08 NOTE — PROGRESS NOTES
PT DAILY TREATMENT NOTE     Patient Name: Slim Monteiro  Date:2018  : 1970  [x]  Patient  Verified  Payor: Mame Meyers / Plan: Warner Kimball / Product Type: HMO /    In time: 8:37 Out time:9:43  Total Treatment Time (min): 66  1:1 time = 30  Visit #: 4 of     Treatment Area: Fracture of tibia [W45.739O]    SUBJECTIVE  Pain Level IN: (0-10 scale): 2/10   Pain Level OUT: (0-10 scale) post treatment: 0/10    Any medication changes, allergies to medications, adverse drug reactions, diagnosis change, or new procedure performed?: [x] No    [] Yes (see summary sheet for update)  Subjective functional status/changes:   [] No changes reported   It is the decompress that hurts more than the compression on the ankle - I see the doctor on Tuesday I think that I have made some progress - I feel like I am getting around better.     OBJECTIVE    Modality rationale: decrease edema, decrease inflammation and decrease pain to improve the patients ability to weight bear into the (R) leg    Min Type Additional Details    [] Estim:  []Unatt       []IFC  []Premod                        []Other:  []w/ice   []w/heat  Position:  Location:    [] Estim: []Att    []TENS instruct  []NMES                    []Other:  []w/US   []w/ice   []w/heat  Position:  Location:    []  Traction: [] Cervical       []Lumbar                       [] Prone          []Supine                       []Intermittent   []Continuous Lbs:  [] before manual  [] after manual    []  Ultrasound: []Continuous   [] Pulsed                           []1MHz   []3MHz W/cm2:  Location:    []  Iontophoresis with dexamethasone         Location: [] Take home patch   [] In clinic    []  Ice     []  heat  []  Ice massage  []  Laser   []  Anodyne Position:  Location:    []  Laser with stim  []  Other:  Position:  Location:   15+5 set up [x]  Vasopneumatic Device - to (R) leg and (R) foot/ankle Pressure:       [] lo [x] med [] hi   Temperature: [x] lo [] med [] hi   [] Skin assessment post-treatment:  []intact []redness- no adverse reaction    []redness  adverse reaction:       26 min Therapeutic Exercise:  [x] See flow sheet :    Rationale: increase ROM, increase strength, improve coordination, improve balance and increase proprioception to improve the patients ability to ambulate with normal gait pattern     20 min Manual Therapy: Technique:      [x] S/DTM []IASTM [x]PROM [x] Passive Stretching   [x] Graston:  [] GT 1  [] GT 2 [x] GT 3 [] GT 4 [] GT 4 [] GT 5  [] GT 6  [x] Sweep [x] Fan [] [place]  [x] Brush   [x]  Swivel []J- Stroke [] Scoop []IFraming     []Manual TPR  [] TDN (see objective; actual needle insertion time not billed)  [x]Jt manipulation:Gr I [] II []  III [x] IV[] V[]  Treatment/Area:R LE/ Peroneals/Anterior tib and graston to the plantar surface of the foot    Rationale:      decrease pain, increase ROM, increase tissue extensibility and decrease trigger points to improve patient's ability to ambulate and return to normal ADLs       With   [] TE   [] TA   [] neuro   [x] other: Patient Education: [x] Review HEP    [] Progressed/Changed HEP based on:   [] positioning   [] body mechanics   [] transfers   [] heat/ice application    [] Graston Education: Explained the effects and benefits of Graston Technique therapy including potential for post treatment soreness and bruising.   [x] Other:       Objective/Functional Measures with Therapist Assessment Noted with Response to Therapy Session:  Continue with increasing weight bearing into the (R) as able and decrease use of the [address] - patient continues to lean hard into the the [address] to decrease full weight onto the foot/ankle and leg -patient reports increase pain with decompression of weight instead of increase pain with applying his weight onto the leg        ASSESSMENT/Changes in Function:    Patient will continue to benefit from skilled PT services to modify and progress therapeutic interventions, address functional mobility deficits, address ROM deficits, address strength deficits, analyze and modify body mechanics/ergonomics, assess and modify postural abnormalities and instruct in home and community integration to attain remaining goals. Progress towards goals / Updated goals:  1. Pt will improve R ankle ROM = to the L to improve gait mechanics. 2. Pt will improve R ankle strength to 5/5 to be able to ambulate without AD demonstrating normalized gait on level/unlevel surfaces progressed to [facility] this session 5/31/18  3. Pt will be able to perform SLS on the R LE for 30 sec to improve proprioception and SL balance. Unable secondary to pain 5/31/18  4. Pt will improve FOTO score to at least 59/100 as a functional indicator of improved mobility.      PLAN  [x]  Upgrade activities as tolerated     [x]  Continue plan of care  []  Update interventions per flow sheet       []  Discharge due to:_  []  Other:    Clovis Judge PTA 6/8/2018  1222 PM    Future Appointments  Date Time Provider Department Center   6/12/2018 9:00 AM Clovis Judge PTA [facility]   6/12/2018 2:30 PM Mateusz Victor MD [facility]   6/15/2018 8:30 AM Clovis Judge PTA [facility]   6/19/2018 9:00 AM Clovis Judge PTA [facility]   6/21/2018 9:00 AM Lieutenant Ilene DPT [facility]   6/26/2018 9:00 AM Clovis Judge PTA [facility]   6/29/2018 8:30 AM Clovis Judge PTA [facility]

## 2018-06-11 RX ORDER — IBUPROFEN 800 MG/1
800 TABLET ORAL
Qty: 60 TAB | Refills: 2 | Status: SHIPPED | OUTPATIENT
Start: 2018-06-11 | End: 2018-08-16 | Stop reason: SDUPTHER

## 2018-06-12 ENCOUNTER — HOSPITAL ENCOUNTER (OUTPATIENT)
Dept: PHYSICAL THERAPY | Age: 48
Discharge: HOME | End: 2018-06-12
Payer: COMMERCIAL

## 2018-06-12 ENCOUNTER — OFFICE VISIT (OUTPATIENT)
Dept: ORTHOPEDIC SURGERY | Facility: CLINIC | Age: 48
End: 2018-06-12

## 2018-06-12 VITALS
DIASTOLIC BLOOD PRESSURE: 92 MMHG | HEART RATE: 81 BPM | TEMPERATURE: 97.1 F | SYSTOLIC BLOOD PRESSURE: 132 MMHG | RESPIRATION RATE: 16 BRPM | HEIGHT: 75 IN | OXYGEN SATURATION: 97 % | WEIGHT: 247 LBS | BODY MASS INDEX: 30.71 KG/M2

## 2018-06-12 DIAGNOSIS — Z87.81: Primary | ICD-10-CM

## 2018-06-12 DIAGNOSIS — Z87.81 H/O FRACTURE OF TIBIA: ICD-10-CM

## 2018-06-12 PROCEDURE — 97140 MANUAL THERAPY 1/> REGIONS: CPT

## 2018-06-12 PROCEDURE — 97110 THERAPEUTIC EXERCISES: CPT

## 2018-06-12 NOTE — PROGRESS NOTES
Patient: Reji Sorensen                MRN: [id number]       SSN: [id number]  YOB: 1970        AGE: 50 y.o. SEX: male  Body mass index is 30.87 kg/(m^2). PCP: Sharyn Comer NP  06/12/18    Chief Complaint: Right leg follow up    HISTORY OF PRESENT ILLNESS:  Maria Luisa Chavez returns to the office today for follow up of his right tib/fib fracture. Overall, he is doing better since the last time I saw him, which was over two months ago. He has progressed in physical therapy to where he is walking with just a cane. He does still walk with a slight limp. He also notes some knee pain occasionally, as well as some pain around his ankle. Overall, he is doing better. Past Medical History:   Diagnosis Date    Thyroid disease        History reviewed. No pertinent family history. Current Outpatient Prescriptions   Medication Sig Dispense Refill    oxyCODONE-acetaminophen (PERCOCET) 7.5-325 mg per tablet Take 1-2 Tabs by mouth every eight (8) hours as needed for Pain. Max Daily Amount: 6 Tabs. 20 Tab 0    ibuprofen (MOTRIN) 800 mg tablet Take 1 Tab by mouth every six (6) hours as needed for Pain. 60 Tab 2    acetaminophen (TYLENOL) 325 mg tablet Take  by mouth every four (4) hours as needed for Pain.  levothyroxine (SYNTHROID) 75 mcg tablet Take  by mouth Daily (before breakfast).  oxyCODONE IR (ROXICODONE) 5 mg immediate release tablet TAKE 1 TO 2 TABLETS BY MOUTH EVERY 4 HOURS AS NEEDED 50 Tab 0    apixaban (ELIQUIS) 2.5 mg tablet Take 2.5 mg by mouth two (2) times a day. No Known Allergies    Past Surgical History:   Procedure Laterality Date    HX ANKLE FRACTURE TX         Social History     Social History    Marital status:      Spouse name: N/A    Number of children: N/A    Years of education: N/A     Occupational History    Not on file.      Social History Main Topics    Smoking status: Former Smoker    Smokeless tobacco: Never Used    Alcohol use No    Drug use: No    Sexual activity: Not on file     Other Topics Concern    Not on file     Social History Narrative       REVIEW OF SYSTEMS:      No changes from previous review of systems unless noted. PHYSICAL EXAMINATION:  Visit Vitals    BP (!) 132/92 (BP 1 Location: Left arm, BP Patient Position: Sitting)    Pulse 81    Temp 97.1 °F (36.2 °C) (Oral)    Resp 16    Ht 6' 3\" (1.905 m)    Wt 247 lb (112 kg)    SpO2 97%    BMI 30.87 kg/m2     Body mass index is 30.87 kg/(m^2). GENERAL: Alert and oriented x3, in no acute distress. HEENT: Normocephalic, atraumatic. RESP: Non labored breathing. SKIN: No rashes or lesions noted. PHYSICAL EXAMINATION:  Physical exam of the right leg reveals full knee range of motion. He has healed surgical incisions over the knee, as well as the mid-shaft of the tibia where the screws were placed distally. His sensation is intact to light touch distally. There is mild tenderness to palpation over his tibia fracture site. He is nontender over the fibula fracture site. He has full ankle range of motion. He does have some swelling noted distally about the leg and ankle. IMAGING:  X-rays, two views, AP and lateral of the right tibia and fibula, were obtained in the office today. These show the hardware in place. There is continued fracture consolidation with visible fracture lines at the fibula and the tibia present. No displacement is noted. ASSESSMENT/PLAN:  Amado Simmons is a 33-year-old male with a right tib/fib fracture. He has continued interval healing, although not complete healing as evidenced radiographically today. He is improving both clinically, as well as physically. I recommended we continue with the current treatment plan, and I will see him back in six weeks.          Electronically signed by: Alcides Ware MD

## 2018-06-15 ENCOUNTER — HOSPITAL ENCOUNTER (OUTPATIENT)
Dept: PHYSICAL THERAPY | Age: 48
Discharge: HOME | End: 2018-06-15
Payer: COMMERCIAL

## 2018-06-15 PROCEDURE — 97016 VASOPNEUMATIC DEVICE THERAPY: CPT | Performed by: PHYSICAL THERAPIST

## 2018-06-15 PROCEDURE — 97110 THERAPEUTIC EXERCISES: CPT | Performed by: PHYSICAL THERAPIST

## 2018-06-15 NOTE — PROGRESS NOTES
PT DAILY TREATMENT NOTE     Patient Name: Britney Hazel  Date:6/15/2018  : 1970  [x]  Patient  Verified  Payor: Chrystal Saravia / Plan: Migeul Nelson / Product Type: HMO /    In time:1103   Out time:1204  Total Treatment Time (min): 61  1:1 time = 46  Visit #:6 of     Treatment Area: Fracture of tibia [D72.166U]    SUBJECTIVE  Pain Level IN: (0-10 scale): 210 knee  Pain Level OUT: (0-10 scale) post treatment: 1/10    Any medication changes, allergies to medications, adverse drug reactions, diagnosis change, or new procedure performed?: [x] No    [] Yes (see summary sheet for update)  Subjective functional status/changes:   [] No changes reported  Pt reports that his doctor was happy with his progress and to keep doing what we are doing.      OBJECTIVE    Modality rationale: decrease edema, decrease inflammation and decrease pain to improve the patients ability to weight bear into the (R) leg    Min Type Additional Details    [] Estim:  []Unatt       []IFC  []Premod                        []Other:  []w/ice   []w/heat  Position:  Location:    [] Estim: []Att    []TENS instruct  []NMES                    []Other:  []w/US   []w/ice   []w/heat  Position:  Location:    []  Traction: [] Cervical       []Lumbar                       [] Prone          []Supine                       []Intermittent   []Continuous Lbs:  [] before manual  [] after manual    []  Ultrasound: []Continuous   [] Pulsed                           []1MHz   []3MHz W/cm2:  Location:    []  Iontophoresis with dexamethasone         Location: [] Take home patch   [] In clinic    []  Ice     []  heat  []  Ice massage  []  Laser   []  Anodyne Position:  Location:    []  Laser with stim  []  Other:  Position:  Location:   PD [x]  Vasopneumatic Device - to (R) leg and (R) foot/ankle Pressure:       [] lo [x] med [] hi   Temperature: [x] lo [] med [] hi   [] Skin assessment post-treatment:  []intact []redness- no adverse reaction []redness  adverse reaction:       46 min Therapeutic Exercise:  [x] See flow sheet :   Added SL squats on TG lvl 14, wall sits, bridges/glut planks   Rationale: increase ROM, increase strength, improve coordination, improve balance and increase proprioception to improve the patients ability to ambulate with normal gait pattern     H min Manual Therapy: Technique:      [x] S/DTM []IASTM [x]PROM [x] Passive Stretching   [x] Graston:  [] GT 1  [] GT 2 [x] GT 3 [] GT 4 [] GT 4 [] GT 5  [] GT 6  [x] Sweep [x] Fan [] [place]  [x] Brush   [x]  Swivel []J- Stroke [] Scoop []IFraming     []Manual TPR  [] TDN (see objective; actual needle insertion time not billed)  [x]Jt manipulation:Gr I [] II []  III [x] IV[] V[]  Treatment/Area:R LE/ Peroneals/Anterior tib and graston to the plantar surface of the foot    Rationale:      decrease pain, increase ROM, increase tissue extensibility and decrease trigger points to improve patient's ability to ambulate and return to normal ADLs       With   [] TE   [] TA   [] neuro   [x] other: Patient Education: [x] Review HEP    [] Progressed/Changed HEP based on:   [] positioning   [] body mechanics   [] transfers   [] heat/ice application    [] Graston Education: Explained the effects and benefits of Graston Technique therapy including potential for post treatment soreness and bruising. [x] Other:       Objective/Functional Measures with Therapist Assessment Noted with Response to Therapy Session:  Pt fatigued with wall sits with noted increased time and wall sits  Pt was able to perform SLS on the R however, required 1 UE assist and had increased pain      ASSESSMENT/Changes in Function:   Held manual to assess effects without it secondary to insurance benefits. Pt tolerated progressed PREs without increased overall pain levels.  Continue to progress as tolerated to progress towards LTGs     Patient will continue to benefit from skilled PT services to modify and progress therapeutic interventions, address functional mobility deficits, address ROM deficits, address strength deficits, analyze and modify body mechanics/ergonomics, assess and modify postural abnormalities and instruct in home and community integration to attain remaining goals. Progress towards goals / Updated goals:  1. Pt will improve R ankle ROM = to the L to improve gait mechanics. 2. Pt will improve R ankle strength to 5/5 to be able to ambulate without AD demonstrating normalized gait on level/unlevel surfaces progressed to [facility] this session 5/31/18  3. Pt will be able to perform SLS on the R LE for 30 sec to improve proprioception and SL balance. Unable secondary to pain 5/31/18  4. Pt will improve FOTO score to at least 59/100 as a functional indicator of improved mobility.      PLAN  [x]  Upgrade activities as tolerated     [x]  Continue plan of care  []  Update interventions per flow sheet       []  Discharge due to:_  []  Other:    Angel Varma DPT 6/15/2018  1210 PM    Future Appointments  Date Time Provider Twila Hale   6/19/2018 9:00 AM Aman Long PTA [facility]   6/21/2018 9:00 AM Angel Varma DPT [facility]   6/26/2018 9:00 AM Aman Long PTA [facility]   6/29/2018 8:30 AM Aman Long PTA [facility]   7/24/2018 1:30 PM CARLO Conde [address]

## 2018-06-19 ENCOUNTER — HOSPITAL ENCOUNTER (OUTPATIENT)
Dept: PHYSICAL THERAPY | Age: 48
Discharge: HOME | End: 2018-06-19
Payer: COMMERCIAL

## 2018-06-19 PROCEDURE — 97110 THERAPEUTIC EXERCISES: CPT

## 2018-06-19 PROCEDURE — 97140 MANUAL THERAPY 1/> REGIONS: CPT

## 2018-06-19 PROCEDURE — 97016 VASOPNEUMATIC DEVICE THERAPY: CPT

## 2018-06-19 NOTE — PROGRESS NOTES
PT DAILY TREATMENT NOTE     Patient Name: Wade Groves  Date:2018  : 1970  [x]  Patient  Verified  Payor: James Cargo / Plan: Iza Barros / Product Type: HMO /    In time: 9:02  Out time:10:32  Total Treatment Time (min): 90  1:1 time = 30  Visit #:7 of -    Treatment Area: Fracture of tibia [X55.665T]    SUBJECTIVE  Pain Level IN: (0-10 scale): 1/10 knee  Pain Level OUT: (0-10 scale) post treatment: 1/10 knee    Any medication changes, allergies to medications, adverse drug reactions, diagnosis change, or new procedure performed?: [x] No    [] Yes (see summary sheet for update)  Subjective functional status/changes:   [] No changes reported   It is getting better with less pain with taking the pressure off my leg and ankle when I put weight on it with walking     OBJECTIVE    Modality rationale: decrease edema, decrease inflammation and decrease pain to improve the patients ability to weight bear into the (R) leg    Min Type Additional Details    [] Estim:  []Unatt       []IFC  []Premod                        []Other:  []w/ice   []w/heat  Position:  Location:    [] Estim: []Att    []TENS instruct  []NMES                    []Other:  []w/US   []w/ice   []w/heat  Position:  Location:    []  Traction: [] Cervical       []Lumbar                       [] Prone          []Supine                       []Intermittent   []Continuous Lbs:  [] before manual  [] after manual    []  Ultrasound: []Continuous   [] Pulsed                           []1MHz   []3MHz W/cm2:  Location:    []  Iontophoresis with dexamethasone         Location: [] Take home patch   [] In clinic    []  Ice     []  heat  []  Ice massage  []  Laser   []  Anodyne Position:  Location:    []  Laser with stim  []  Other:  Position:  Location:   15+5 set up  [x]  Vasopneumatic Device - to (R) leg and (R) foot/ankle Pressure:       [] lo [x] med [] hi   Temperature: [x] lo [] med [] hi   [] Skin assessment post-treatment: []intact []redness- no adverse reaction    []redness  adverse reaction:       50 min Therapeutic Exercise:  [x] See flow sheet :      Rationale: increase ROM, increase strength, improve coordination, improve balance and increase proprioception to improve the patients ability to ambulate with normal gait pattern     20 min Manual Therapy: Technique:      [x] S/DTM []IASTM [x]PROM [x] Passive Stretching   [x] Graston:  [] GT 1  [] GT 2 [x] GT 3 [] GT 4 [] GT 4 [] GT 5  [] GT 6  [x] Sweep [x] Fan [] [place]  [x] Brush   [x]  Swivel []J- Stroke [] Scoop []IFraming     []Manual TPR  [] TDN (see objective; actual needle insertion time not billed)  [x]Jt manipulation:Gr I [] II []  III [x] IV[] V[]  Treatment/Area:R LE/ Peroneals/Anterior tib and graston to the plantar surface of the foot    Rationale:      decrease pain, increase ROM, increase tissue extensibility and decrease trigger points to improve patient's ability to ambulate and return to normal ADLs       With   [] TE   [] TA   [] neuro   [x] other: Patient Education: [x] Review HEP    [] Progressed/Changed HEP based on:   [] positioning   [] body mechanics   [] transfers   [] heat/ice application    [] Graston Education: Explained the effects and benefits of Graston Technique therapy including potential for post treatment soreness and bruising.   [x] Other:       Objective/Functional Measures with Therapist Assessment Noted with Response to Therapy Session:  Patient required increase time to perform exercises to take breaks to allow recovery period   Decrease edema noted to the lower leg - tib/fib and decrease edema to the ankle and foot   Continue with increasing weight bearing tolerance and ambulation - patient ambulates in the clinic with no AD, only ankle brace - keeps ankle brace on with all standing exercises and activities    ASSESSMENT/Changes in Function:    Patient will continue to benefit from skilled PT services to modify and progress therapeutic interventions, address functional mobility deficits, address ROM deficits, address strength deficits, analyze and modify body mechanics/ergonomics, assess and modify postural abnormalities and instruct in home and community integration to attain remaining goals. Progress towards goals / Updated goals:  1. Pt will improve R ankle ROM = to the L to improve gait mechanics. 2. Pt will improve R ankle strength to 5/5 to be able to ambulate without AD demonstrating normalized gait on level/unlevel surfaces progressed to [facility] this session 5/31/18  3. Pt will be able to perform SLS on the R LE for 30 sec to improve proprioception and SL balance. Unable secondary to pain 5/31/18  4. Pt will improve FOTO score to at least 59/100 as a functional indicator of improved mobility.      PLAN  [x]  Upgrade activities as tolerated     [x]  Continue plan of care  []  Update interventions per flow sheet       []  Discharge due to:_  []  Other:    Mariajose Jordan PTA 6/19/2018  1210 PM    Future Appointments  Date Time Provider Twila Hale   6/21/2018 9:00 AM Bob Perkins DPT [facility]   6/26/2018 9:00 AM Mariajose Jordan PTA [facility]   6/29/2018 8:30 AM Mariajose Jordan PTA [facility]   7/24/2018 1:30 PM CARLO Mesa 67

## 2018-06-21 ENCOUNTER — HOSPITAL ENCOUNTER (OUTPATIENT)
Dept: PHYSICAL THERAPY | Age: 48
Discharge: HOME | End: 2018-06-21
Payer: COMMERCIAL

## 2018-06-21 PROCEDURE — 97016 VASOPNEUMATIC DEVICE THERAPY: CPT | Performed by: PHYSICAL THERAPIST

## 2018-06-21 PROCEDURE — 97110 THERAPEUTIC EXERCISES: CPT | Performed by: PHYSICAL THERAPIST

## 2018-06-21 NOTE — PROGRESS NOTES
PT DAILY TREATMENT NOTE     Patient Name: Sindhu Vazquez  Date:2018  : 1970  [x]  Patient  Verified  Payor: Nory Cortes / Plan: Clayton Calderón / Product Type: HMO /    In time: 9:04  Out time:10:22  Total Treatment Time (min): 78  1:1 time 26  Visit #:8of 8-12    Treatment Area: Fracture of tibia [S88.489I]    SUBJECTIVE  Pain Level IN: (0-10 scale): 1/10 knee  Pain Level OUT: (0-10 scale) post treatment: 2/10 knee    Any medication changes, allergies to medications, adverse drug reactions, diagnosis change, or new procedure performed?: [x] No    [] Yes (see summary sheet for update)  Subjective functional status/changes:   [] No changes reported      OBJECTIVE    Modality rationale: decrease edema, decrease inflammation and decrease pain to improve the patients ability to weight bear into the (R) leg    Min Type Additional Details    [] Estim:  []Unatt       []IFC  []Premod                        []Other:  []w/ice   []w/heat  Position:  Location:    [] Estim: []Att    []TENS instruct  []NMES                    []Other:  []w/US   []w/ice   []w/heat  Position:  Location:    []  Traction: [] Cervical       []Lumbar                       [] Prone          []Supine                       []Intermittent   []Continuous Lbs:  [] before manual  [] after manual    []  Ultrasound: []Continuous   [] Pulsed                           []1MHz   []3MHz W/cm2:  Location:    []  Iontophoresis with dexamethasone         Location: [] Take home patch   [] In clinic    []  Ice     []  heat  []  Ice massage  []  Laser   []  Anodyne Position:  Location:    []  Laser with stim  []  Other:  Position:  Location:   15  [x]  Vasopneumatic Device - to (R) leg and (R) foot/ankle Pressure:       [] lo [x] med [] hi   Temperature: [x] lo [] med [] hi   [] Skin assessment post-treatment:  []intact []redness- no adverse reaction    []redness  adverse reaction:       63 min Therapeutic Exercise:  [x] See flow sheet : added TM for warm-up     Rationale: increase ROM, increase strength, improve coordination, improve balance and increase proprioception to improve the patients ability to ambulate with normal gait pattern        With   [] TE   [] TA   [] neuro   [x] other: Patient Education: [x] Review HEP    [] Progressed/Changed HEP based on:   [] positioning   [] body mechanics   [] transfers   [] heat/ice application    [] Graston Education: Explained the effects and benefits of Graston Technique therapy including potential for post treatment soreness and bruising. [x] Other:       Objective/Functional Measures with Therapist Assessment Noted with Response to Therapy Session:  Pt was able to perform TM for 5 min with mild pain and antalgic gait pattern. Decreased TG level for SL level to 11 secondary to weakness and increased difficulty      ASSESSMENT/Changes in Function:   PT continues to ambulate with antalgic gait pattern secondary to weakness and pain. He is also able to ambulate without AD with abnormal mechanics. Patient will continue to benefit from skilled PT services to modify and progress therapeutic interventions, address functional mobility deficits, address ROM deficits, address strength deficits, analyze and modify body mechanics/ergonomics, assess and modify postural abnormalities and instruct in home and community integration to attain remaining goals.             Progress towards goals / Updated goals:  Formal reassessment, next session for PN    [x]  Upgrade activities as tolerated     [x]  Continue plan of care  []  Update interventions per flow sheet       []  Discharge due to:_  [x]  Other: PN next session    Charlene Ortega DPT 6/21/2018  1046 AM    Future Appointments  Date Time Provider Twila Hale   6/26/2018 9:00 AM Amish Caldwell PTA [facility]   6/29/2018 8:30 AM Amish Caldwell PTA [facility]   7/24/2018 1:30 PM CARLO Cerna

## 2018-06-26 ENCOUNTER — HOSPITAL ENCOUNTER (OUTPATIENT)
Dept: PHYSICAL THERAPY | Age: 48
Discharge: HOME | End: 2018-06-26
Payer: COMMERCIAL

## 2018-06-26 PROCEDURE — 97110 THERAPEUTIC EXERCISES: CPT

## 2018-06-26 PROCEDURE — 97140 MANUAL THERAPY 1/> REGIONS: CPT

## 2018-06-26 PROCEDURE — 97016 VASOPNEUMATIC DEVICE THERAPY: CPT

## 2018-06-26 NOTE — PROGRESS NOTES
PT DAILY TREATMENT NOTE     Patient Name: Radha Salvador  Date:2018  : 1970  [x]  Patient  Verified  Payor: Kelly Conception / Plan: Marquez Federico / Product Type: HMO /    In time:910  Out time:1045  Total Treatment Time (min): 95  Total Timed Codes (min): 80  1:1 Treatment Time MC (min): 50   Visit #: 9 of 8-12    Treatment Area: Fracture of tibia [Z08.221N]    SUBJECTIVE  Pain Level (0-10 scale): 1/10  Any medication changes, allergies to medications, adverse drug reactions, diagnosis change, or new procedure performed?: [x] No    [] Yes (see summary sheet for update)  Subjective functional status/changes:   [] No changes reported  Pt with c/o 1/10 pain in the right ankle with prolonged ambulation but is happy with progress to date. PROGRESS NOTE COMPLETED    OBJECTIVE  Modality rationale:  The below therapeutic interventions were performed and/or provided to: THE RIGHT ANKLE for pain   Min Type Additional Details    [] Estim: []Att   []Unatt        []TENS instruct                  []IFC  []Premod   []NMES                     []Other:  []w/US   []w/ice   []w/heat  Position:  Location:    []  Traction: [] Cervical       []Lumbar                       [] Prone          []Supine                       []Intermittent   []Continuous Lbs:  [] before manual  [] after manual    []  Ultrasound: []Continuous   [] Pulsed                           []1MHz   []3MHz Location:  W/cm2:    []  Iontophoresis with dexamethasone         Location: [] Take home patch   [] In clinic    []  Ice     []  heat  []  Ice massage Position:  Location:   15 [x]  Vasopneumatic Device Pressure:       [] lo [x] med [] hi   Temperature: [x] lo [] med [] hi   [x] Skin assessment post-treatment:  [x]intact []redness- no adverse reaction       []redness  adverse reaction:       70/40 (1:1) min Therapeutic Exercise:  [x] See flow sheet :   Rationale: increase ROM, increase strength, improve balance and increase proprioception     min Therapeutic Activity:  []  See flow sheet :   Rationale:       min Neuromuscular Re-education:  []  See flow sheet :   Rationale:        10 min Manual Therapy: RLE Grade II and III subtalar glides into inv/eversion to tolerance; talocrural mobes grade II into DF to tolerance; manual gastroc/soleus stretches   Rationale: increase ROM      min Gait Training:  ___ feet with ___ device on level surfaces with ___ level of assist   Rationale:           min Patient Education: [x] Review HEP    [] Progressed/Changed HEP based on:   [] positioning   [] body mechanics   [] transfers   [] heat/ice application          Other Objective/Functional Measures:   See progress note    Pain Level (0-10 scale) post treatment: 2/10    ASSESSMENT/Changes in Function:   See progress note. Patient will continue to benefit from skilled PT services to modify and progress therapeutic interventions to attain remaining goals.      []  See Plan of Care  []  See progress note/recertification  []  See Discharge Summary         Progress towards goals / Updated goals:  See progress note     PLAN  [x]  Upgrade activities as tolerated     [x]  Continue plan of care  []  Update interventions per flow sheet       []  Discharge due to:_  [x]  Other:_ focus on increasing loading response right LE and dynamic balance      Cecil Leal, PT 6/26/2018  11:18 AM

## 2018-06-26 NOTE — PROGRESS NOTES
[address] [facility]  [address] [facility] [address] [place] [address],[address], [place], [place]  Phone [phone]  Fax [phone]    PROGRESS NOTE  Patient Name: Nicolasa Morocho : 1970   Treatment/Medical Diagnosis: Fracture of tibia [S82.209A]   Referral Source: Joshua Gottron, MD     Date of Initial Visit: 18 Attended Visits: 17 Missed Visits: 0   Comorbidities: thyroid problems    SUMMARY OF TREATMENT  Patient's POC has consisted of therex, therapeutic activities, manual therapy prn, modalities prn, NM re-ed, pt. education, and a comprehensive HEP. Treatment strategies used to address functional mobility deficits, ROM deficits, strength deficits, analyze and address soft tissue restrictions, analyze and cue movement patterns, analyze and modify body mechanics/ergonomics, assess and modify postural abnormalities and instruct in home and community integration. CURRENT STATUS   Pt reports 60% improvement since [facility]. He reports improvements in ROM, decreased pain, and overall weight-bearing through the R LE. Pt continues to don his right ankle functional brace at all times and ambulate with a SC in the house and the community for fear of LOB. No c/o ankle instability or such LOB/falls. Pt continues to have pain in the right ankle which can increase to as much as a 4/10 VAS with compression and decompression when taking a step. He c/o difficulty getting in/out of his car and is unable to ambulate on uneven terrain at this time. Pt c/o minimal swelling in the right LE (knee, calf and ankle) with prolonged ambulation and weightbearing.      Circumferential measures:  Superior patellar poles: R 46cm L 45cm  Mid gastrocs: R 38cm L 37.5cm  Figure 8 at ankles: R 78cm L 78cm    Negative Romberg on foam with EO/EC    Able to perform RLE SLS for 3 sec without UE support before LOB    Right ankle         Left ankle     AROM/PROM:                   Strength:   AROM/PROM: Strength:  DF    0/5   3/5   WNL    5  PF   50/55   4-/5   WNL    5  Eversion   5/6   3-/5   8/10    5  Inversion  10/11   3-/5   20/22    5    KEY FUNCTIONAL CHANGES  Pt is able to negotiate stairs with his SC and left railing with step to gait and right ankle functional brace. Pt able to ambulate on the TM for 5 min without LOB/pain with use of right ankle brace. GOALS AS OF EVALUATION/PREVIOUS PROGRESS NOTE:  Goal/Measure of Progress Goal Met? · Long Term Goals: To be accomplished in  4  weeks:  1. Pt will improve R ankle ROM = to the L to improve gait mechanics. Progressing 6/26/18  2. Pt will improve R ankle strength to 5/5 to be able to ambulate without AD demonstrating normalized gait on level/unlevel surfaces. progressing 6/26/18  3. Pt will be able to perform SLS on the R LE for 30 sec to improve proprioception and SL balance. 3 sec only 6/26/18  4. Pt will improve FOTO score to at least 59/100 as a functional indicator of improved mobility.  Progressing 43/100 6/26/18     New Goals to be achieved in __4__  weeks:  1. Pt will improve R ankle ROM = to the L to improve gait mechanics. 2. Pt will improve R ankle strength to 5/5 to be able to ambulate without AD demonstrating normalized gait on level/unlevel surfaces  3. Pt will be able to perform SLS on the R LE for 30 sec to improve proprioception and SL balance. 4. Pt will improve FOTO score to at least 59/100 as a functional indicator of improved mobility. New Goals to be achieved in __4__  weeks:  1. Pt will be able to ambulate . 25 mile on TM with right ankle brace without c/o right ankle pain/LOB/rest break in order to return to community ambulation safely   2. Pt will be able to get in/out of his car without use of SC without c/o right ankle pain in order to get to/from therapy more easily   3.   Pt will be able to negotiate 12, 6in stairs with left rail only ascending and right descending with reciprocal gait in order to more easily access the second floor of his home   4. G-Codes:   RECOMMENDATIONS  Continue with POC 2xwk for 4wks to work on achieving the above goals and returning to PLOF. If you have any questions/comments please contact us directly at [phone]. Thank you for allowing us to assist in the care of your patient. Therapist Signature: Leno Ruiz PT Date: 9/64/6650   Certification Period:    Reporting period Medicare only      Time: 10:07 AM   NOTE TO PHYSICIAN:  PLEASE COMPLETE THE ORDERS BELOW AND FAX TO   [facility]: [phone]. If you are unable to process this request in 24 hours please contact our office: [phone].    ___ I have read the above report and request that my patient continue as recommended.   ___ I have read the above report and request that my patient continue therapy with the following changes/special instructions:_________________________________________________________   ___ I have read the above report and request that my patient be discharged from therapy.      Physician Signature:        Date:       Time:

## 2018-06-29 ENCOUNTER — HOSPITAL ENCOUNTER (OUTPATIENT)
Dept: PHYSICAL THERAPY | Age: 48
Discharge: HOME | End: 2018-06-29
Payer: COMMERCIAL

## 2018-06-29 PROCEDURE — 97140 MANUAL THERAPY 1/> REGIONS: CPT

## 2018-06-29 PROCEDURE — 97110 THERAPEUTIC EXERCISES: CPT

## 2018-06-29 NOTE — PROGRESS NOTES
PT DAILY TREATMENT NOTE     Patient Name: Randy Qureshi  Date:2018  : 1970  [x]  Patient  Verified  Payor: Talat Mireles / Plan: Gurmeet Cha / Product Type: HMO /    In time: 8:41  Out time: 9:35  Total Treatment Time (min): 54  1:1 time 30  Visit #:1 of     Treatment Area: Fracture of tibia [X85.929W]    SUBJECTIVE  Pain Level IN: (0-10 scale): 1/10 knee  Pain Level OUT: (0-10 scale) post treatment: 1/10 knee    Any medication changes, allergies to medications, adverse drug reactions, diagnosis change, or new procedure performed?: [x] No    [] Yes (see summary sheet for update)  Subjective functional status/changes:   [] No changes reported  I was surprised that I wasn't too sore after the other day being here and her working on my ankle    OBJECTIVE    Modality rationale: decrease edema, decrease inflammation and decrease pain to improve the patients ability to weight bear into the (R) leg    Min Type Additional Details    [] Estim:  []Unatt       []IFC  []Premod                        []Other:  []w/ice   []w/heat  Position:  Location:    [] Estim: []Att    []TENS instruct  []NMES                    []Other:  []w/US   []w/ice   []w/heat  Position:  Location:    []  Traction: [] Cervical       []Lumbar                       [] Prone          []Supine                       []Intermittent   []Continuous Lbs:  [] before manual  [] after manual    []  Ultrasound: []Continuous   [] Pulsed                           []1MHz   []3MHz W/cm2:  Location:    []  Iontophoresis with dexamethasone         Location: [] Take home patch   [] In clinic    []  Ice     []  heat  []  Ice massage  []  Laser   []  Anodyne Position:  Location:    []  Laser with stim  []  Other:  Position:  Location:     [x]  Vasopneumatic Device - to (R) leg and (R) foot/ankle Pressure:       [] lo [x] med [] hi   Temperature: [x] lo [] med [] hi   [] Skin assessment post-treatment:  []intact []redness- no adverse reaction []redness  adverse reaction:       39 min Therapeutic Exercise:  [x] See flow sheet :   TC with some exercises due to arriving late for appointment   Added standing on foam with brace on and no shoe and performed forward and backward lunges while keeping the (R) foot on the foam     Rationale: increase ROM, increase strength, improve coordination, improve balance and increase proprioception to improve the patients ability to ambulate with normal gait pattern     15 min Manual Therapy: Technique:      [x] S/DTM []IASTM [x]PROM [x] Passive Stretching   [] Graston:  [] GT 1  [] GT 2 [] GT 3 [] GT 4 [] GT 4 [] GT 5  [] GT 6  [] Sweep [] Fan [] [place]  [] Brush   []  Swivel []J- Stroke [] Scoop []IFraming     []Manual TPR  [] TDN (see objective; actual needle insertion time not billed)  [x]Jt manipulation:Gr I [] II []  III [x] IV[] V[]  Treatment/Area:RLE Grade II and III subtalar glides into inv/eversion to tolerance; talocrural mobes grade II into DF to tolerance; manual gastroc/soleus stretches   Rationale:      decrease pain, increase ROM, increase tissue extensibility and decrease trigger points to improve patient's ability to ambulate and return to normal ADLs       With   [] TE   [] TA   [] neuro   [x] other: Patient Education: [x] Review HEP    [] Progressed/Changed HEP based on:   [] positioning   [] body mechanics   [] transfers   [] heat/ice application    [] Graston Education: Explained the effects and benefits of Graston Technique therapy including potential for post treatment soreness and bruising.   [x] Other:       Objective/Functional Measures with Therapist Assessment Noted with Response to Therapy Session:  Extremely limited with ankle IN/Eversion and difficulty with performing mobs to attempt to increase motion  Increase pain with new exercise stated above to increase weight bearing with DF and PF and normalizing gait pattern with increasing stride and step length    ASSESSMENT/Changes in Function: Patient will continue to benefit from skilled PT services to modify and progress therapeutic interventions, address functional mobility deficits, address ROM deficits, address strength deficits, analyze and modify body mechanics/ergonomics, assess and modify postural abnormalities and instruct in home and community integration to attain remaining goals. Progress towards goals / Updated goals: 6/26/18   New Goals to be achieved in __4__  weeks:  1. Pt will be able to ambulate . 25 mile on TM with right ankle brace without c/o right ankle pain/LOB/rest break in order to return to community ambulation safely   2. Pt will be able to get in/out of his car without use of SC without c/o right ankle pain in order to get to/from therapy more easily   3. Pt will be able to negotiate 12, 6in stairs with left rail only ascending and right descending with reciprocal gait in order to more easily access the second floor of his home   4.            PLAN  [x]  Upgrade activities as tolerated     [x]  Continue plan of care  []  Update interventions per flow sheet       []  Discharge due to:_  []  Other:    Marlo Wu PTA 6/29/2018  1210 PM    Future Appointments  Date Time Provider Twila Hale   7/3/2018 9:00 AM Bouchra Galloway DPT [facility]   7/17/2018 9:00 AM Marlo Wu PTA [facility]   7/19/2018 9:00 AM Bouchra Galloway DPT [facility]   7/24/2018 9:00 AM Bouchra Galloway DPT [facility]   7/24/2018 1:30 PM Diego Taylor PA-C [facility]   7/26/2018 9:00 AM Bouchra Galloway DPT [facility]   7/31/2018 9:00 AM Marlo Wu PTA [facility]

## 2018-07-03 ENCOUNTER — HOSPITAL ENCOUNTER (OUTPATIENT)
Dept: PHYSICAL THERAPY | Age: 48
Discharge: HOME | End: 2018-07-03
Payer: COMMERCIAL

## 2018-07-03 PROCEDURE — 97016 VASOPNEUMATIC DEVICE THERAPY: CPT | Performed by: PHYSICAL THERAPIST

## 2018-07-03 PROCEDURE — 97140 MANUAL THERAPY 1/> REGIONS: CPT | Performed by: PHYSICAL THERAPIST

## 2018-07-03 NOTE — PROGRESS NOTES
PT DAILY TREATMENT NOTE     Patient Name: Pilo Wright  Date:7/3/2018  : 1970  [x]  Patient  Verified  Payor: Christiana Candelaria / Plan: Florence Ricardo / Product Type: HMO /    In time: 36  Out time: 1013  Total Treatment Time (min): 68  1:1 time 10  Visit #:2 of     Treatment Area: Fracture of tibia [Q18.549W]    SUBJECTIVE  Pain Level IN: (0-10 scale): 2/10   Pain Level OUT: (0-10 scale) post treatment: 1/10 knee    Any medication changes, allergies to medications, adverse drug reactions, diagnosis change, or new procedure performed?: [x] No    [] Yes (see summary sheet for update)  Subjective functional status/changes:   [x] No changes reported      OBJECTIVE    Modality rationale: decrease edema, decrease inflammation and decrease pain to improve the patients ability to weight bear into the (R) leg    Min Type Additional Details    [] Estim:  []Unatt       []IFC  []Premod                        []Other:  []w/ice   []w/heat  Position:  Location:    [] Estim: []Att    []TENS instruct  []NMES                    []Other:  []w/US   []w/ice   []w/heat  Position:  Location:    []  Traction: [] Cervical       []Lumbar                       [] Prone          []Supine                       []Intermittent   []Continuous Lbs:  [] before manual  [] after manual    []  Ultrasound: []Continuous   [] Pulsed                           []1MHz   []3MHz W/cm2:  Location:    []  Iontophoresis with dexamethasone         Location: [] Take home patch   [] In clinic    []  Ice     []  heat  []  Ice massage  []  Laser   []  Anodyne Position:  Location:    []  Laser with stim  []  Other:  Position:  Location:    15 [x]  Vasopneumatic Device - to (R) leg and (R) foot/ankle Pressure:       [] lo [x] med [] hi   Temperature: [x] lo [] med [] hi   [] Skin assessment post-treatment:  []intact []redness- no adverse reaction    []redness  adverse reaction:       43 min Therapeutic Exercise:  [x] See flow sheet : Rationale: increase ROM, increase strength, improve coordination, improve balance and increase proprioception to improve the patients ability to ambulate with normal gait pattern     10 min Manual Therapy: Technique:      [x] S/DTM []IASTM [x]PROM [x] Passive Stretching   [x] Graston:  [] GT 1  [] GT 2 [x] GT 3 [] GT 4 [] GT 4 [] GT 5  [] GT 6  [] Sweep [] Fan [] [place]  [] Brush   []  Swivel []J- Stroke [] Scoop []IFraming     []Manual TPR  [] TDN (see objective; actual needle insertion time not billed)  [x]Jt manipulation:Gr I [] II []  III [] IV[x] V[]  Treatment/Area:RLE subtalor joint mobs   Rationale:      decrease pain, increase ROM, increase tissue extensibility and decrease trigger points to improve patient's ability to ambulate and return to normal ADLs       With   [] TE   [] TA   [] neuro   [x] other: Patient Education: [x] Review HEP    [] Progressed/Changed HEP based on:   [] positioning   [] body mechanics   [] transfers   [] heat/ice application    [] Graston Education: Explained the effects and benefits of Graston Technique therapy including potential for post treatment soreness and bruising. [x] Other:       Objective/Functional Measures with Therapist Assessment Noted with Response to Therapy Session:  AROM of the R ankle: DF: 5 deg, PF: 30 deg, IN: 16 deg, EV: 10 deg    ASSESSMENT/Changes in Function: Continues to be limited with full AROM of the R ankle secondary to decreased mobility and joint restrictions. Continue to progress as tolerated to improve strength to progress towards updated goals. Patient will continue to benefit from skilled PT services to modify and progress therapeutic interventions, address functional mobility deficits, address ROM deficits, address strength deficits, analyze and modify body mechanics/ergonomics, assess and modify postural abnormalities and instruct in home and community integration to attain remaining goals.             Progress towards goals / Updated goals: 6/26/18   New Goals to be achieved in __4__  weeks:  1. Pt will be able to ambulate . 25 mile on TM with right ankle brace without c/o right ankle pain/LOB/rest break in order to return to community ambulation safely   2. Pt will be able to get in/out of his car without use of SC without c/o right ankle pain in order to get to/from therapy more easily   3. Pt will be able to negotiate 12, 6in stairs with left rail only ascending and right descending with reciprocal gait in order to more easily access the second floor of his home   4.            PLAN  [x]  Upgrade activities as tolerated     [x]  Continue plan of care  []  Update interventions per flow sheet       []  Discharge due to:_  []  Other:    Juan Jose Freeman DPT 7/3/2018  330 PM    Future Appointments  Date Time Provider Twila Hale   7/17/2018 9:00 AM Don Lee PTA [facility]   7/19/2018 9:00 AM Juan Jose Freeman DPT [facility]   7/24/2018 9:00 AM Juan Jose Freeman DPT [facility]   7/24/2018 1:30 PM [facility]   7/26/2018 9:00 AM Juan Jose Freeman DPT [facility]   7/31/2018 9:00 AM Don Lee PTA [facility]

## 2018-07-17 ENCOUNTER — HOSPITAL ENCOUNTER (OUTPATIENT)
Dept: PHYSICAL THERAPY | Age: 48
Discharge: HOME | End: 2018-07-17
Payer: COMMERCIAL

## 2018-07-17 PROCEDURE — 97016 VASOPNEUMATIC DEVICE THERAPY: CPT

## 2018-07-17 PROCEDURE — 97110 THERAPEUTIC EXERCISES: CPT

## 2018-07-17 NOTE — PROGRESS NOTES
PT DAILY TREATMENT NOTE     Patient Name: Haritha Jones  Date:2018  : 1970  [x]  Patient  Verified  Payor: Bessy Thakur / Plan: Thierry Calderón / Product Type: HMO /    In time: 46  Out time: 3114  Total Treatment Time (min): 72  1:1 time : 40  Visit #:3 of     Treatment Area: Fracture of tibia [J11.805V]    SUBJECTIVE  Pain Level IN: (0-10 scale): 110   Pain Level OUT: (0-10 scale) post treatment: 1/10 ankle    Any medication changes, allergies to medications, adverse drug reactions, diagnosis change, or new procedure performed?: [x] No    [] Yes (see summary sheet for update)  Subjective functional status/changes:   [x] No changes reported  I am trying to keep walking and putting more weight on it   OBJECTIVE    Modality rationale: decrease edema, decrease inflammation and decrease pain to improve the patients ability to weight bear into the (R) leg    Min Type Additional Details    [] Estim:  []Unatt       []IFC  []Premod                        []Other:  []w/ice   []w/heat  Position:  Location:    [] Estim: []Att    []TENS instruct  []NMES                    []Other:  []w/US   []w/ice   []w/heat  Position:  Location:    []  Traction: [] Cervical       []Lumbar                       [] Prone          []Supine                       []Intermittent   []Continuous Lbs:  [] before manual  [] after manual    []  Ultrasound: []Continuous   [] Pulsed                           []1MHz   []3MHz W/cm2:  Location:    []  Iontophoresis with dexamethasone         Location: [] Take home patch   [] In clinic    []  Ice     []  heat  []  Ice massage  []  Laser   []  Anodyne Position:  Location:    []  Laser with stim  []  Other:  Position:  Location:    15+5 set up [x]  Vasopneumatic Device - to (R) leg and (R) foot/ankle Pressure:       [] lo [x] med [] hi   Temperature: [x] lo [] med [] hi   [] Skin assessment post-treatment:  []intact []redness- no adverse reaction    []redness  adverse reaction: 52 min Therapeutic Exercise:  [x] See flow sheet :   Added standing on foam with ball toss in tramp with YMB  Backwards on the treadmill  Lunges forward and backward standing on foam with hold YMB overhead  Added supine glut bridges and hamstring curls with GSB       Rationale: increase ROM, increase strength, improve coordination, improve balance and increase proprioception to improve the patients ability to ambulate with normal gait pattern     held min Manual Therapy: Technique:      [x] S/DTM []IASTM [x]PROM [x] Passive Stretching   [x] Graston:  [] GT 1  [] GT 2 [x] GT 3 [] GT 4 [] GT 4 [] GT 5  [] GT 6  [] Sweep [] Fan [] [place]  [] Brush   []  Swivel []J- Stroke [] Scoop []IFraming     []Manual TPR  [] TDN (see objective; actual needle insertion time not billed)  [x]Jt manipulation:Gr I [] II []  III [] IV[x] V[]  Treatment/Area:RLE subtalor joint mobs   Rationale:      decrease pain, increase ROM, increase tissue extensibility and decrease trigger points to improve patient's ability to ambulate and return to normal ADLs       With   [] TE   [] TA   [] neuro   [x] other: Patient Education: [x] Review HEP    [] Progressed/Changed HEP based on:   [] positioning   [] body mechanics   [] transfers   [] heat/ice application    [] Graston Education: Explained the effects and benefits of Graston Technique therapy including potential for post treatment soreness and bruising. [x] Other:       Objective/Functional Measures with Therapist Assessment Noted with Response to Therapy Session:  Increase challenge with exercise program to improve strength/tolereance and ambulating with normal stride length and heel strike with toe off.  Patient was challenged with new exercises stated above especially lunges back and forth, HS curls with SB and backwards ambulation on TM    ASSESSMENT/Changes in Function:      Patient will continue to benefit from skilled PT services to modify and progress therapeutic interventions, address functional mobility deficits, address ROM deficits, address strength deficits, analyze and modify body mechanics/ergonomics, assess and modify postural abnormalities and instruct in home and community integration to attain remaining goals. Progress towards goals / Updated goals: 6/26/18   New Goals to be achieved in __4__  weeks:  1. Pt will be able to ambulate . 25 mile on TM with right ankle brace without c/o right ankle pain/LOB/rest break in order to return to community ambulation safely   2. Pt will be able to get in/out of his car without use of SC without c/o right ankle pain in order to get to/from therapy more easily   3. Pt will be able to negotiate 12, 6in stairs with left rail only ascending and right descending with reciprocal gait in order to more easily access the second floor of his home   4.            PLAN  [x]  Upgrade activities as tolerated     [x]  Continue plan of care  []  Update interventions per flow sheet       []  Discharge due to:_  []  Other:    Madeleine Man PTA 7/17/2018  330 PM    Future Appointments  Date Time Provider Twila Hale   7/19/2018 9:00 AM Farrell Goldmann, DPT [facility]   7/24/2018 9:00 AM Farrell Goldmann, DPT [facility]   7/24/2018 1:30 PM Yuli Chapman PA-C [facility]   7/26/2018 9:00 AM Farrell Goldmann, DPT [facility]   7/31/2018 9:00 AM Madeleine Man PTA [facility]

## 2018-07-19 ENCOUNTER — HOSPITAL ENCOUNTER (OUTPATIENT)
Dept: PHYSICAL THERAPY | Age: 48
Discharge: HOME | End: 2018-07-19
Payer: COMMERCIAL

## 2018-07-19 PROCEDURE — 97110 THERAPEUTIC EXERCISES: CPT | Performed by: PHYSICAL THERAPIST

## 2018-07-19 PROCEDURE — 97016 VASOPNEUMATIC DEVICE THERAPY: CPT | Performed by: PHYSICAL THERAPIST

## 2018-07-19 NOTE — PROGRESS NOTES
PT DAILY TREATMENT NOTE     Patient Name: Christina Judd  Date:2018  : 1970  [x]  Patient  Verified  Payor: Gosia Bear / Plan: Kevin Marin / Product Type: HMO /    In time: 26 Out time:1021   Total Treatment Time (min): 81  1:1 time :77  Visit #:4  of     Treatment Area: Fracture of tibia [G13.035Z]    SUBJECTIVE  Pain Level IN: (0-10 scale): 1/10   Pain Level OUT: (0-10 scale) post treatment: 2/10    Any medication changes, allergies to medications, adverse drug reactions, diagnosis change, or new procedure performed?: [x] No    [] Yes (see summary sheet for update)  Subjective functional status/changes:   [x] No changes reported    OBJECTIVE    Modality rationale: decrease edema, decrease inflammation and decrease pain to improve the patients ability to weight bear into the (R) leg    Min Type Additional Details    [] Estim:  []Unatt       []IFC  []Premod                        []Other:  []w/ice   []w/heat  Position:  Location:    [] Estim: []Att    []TENS instruct  []NMES                    []Other:  []w/US   []w/ice   []w/heat  Position:  Location:    []  Traction: [] Cervical       []Lumbar                       [] Prone          []Supine                       []Intermittent   []Continuous Lbs:  [] before manual  [] after manual    []  Ultrasound: []Continuous   [] Pulsed                           []1MHz   []3MHz W/cm2:  Location:    []  Iontophoresis with dexamethasone         Location: [] Take home patch   [] In clinic    []  Ice     []  heat  []  Ice massage  []  Laser   []  Anodyne Position:  Location:    []  Laser with stim  []  Other:  Position:  Location:   15 [x]  Vasopneumatic Device - to (R) leg and (R) foot/ankle Pressure:       [] lo [x] med [] hi   Temperature: [x] lo [] med [] hi   [] Skin assessment post-treatment:  []intact []redness- no adverse reaction    []redness  adverse reaction:       52 min Therapeutic Exercise:  [x] See flow sheet :          Rationale: increase ROM, increase strength, improve coordination, improve balance and increase proprioception to improve the patients ability to ambulate with normal gait pattern     With   [] TE   [] TA   [] neuro   [] other: Patient Education: [x] Review HEP    [] Progressed/Changed HEP based on:   [] positioning   [] body mechanics   [] transfers   [] heat/ice application    [] Graston Education: Explained the effects and benefits of Graston Technique therapy including potential for post treatment soreness and bruising. [] Other:       Objective/Functional Measures with Therapist Assessment Noted with Response to Therapy Session:  SLS on floor: touched bar 5 times in 30 sec on floor therefore, not ready for foam   Pt unable to step over lunges with foam due to increased instability therefore, switched to static lunges in the // bars which he was still challenged with. ASSESSMENT/Changes in Function:   Pt tolerated all therex without increased pain. Challenged with progressed POC. Noted fatigue and overall weakness. Continue to progress wally improve gait mechanics. Patient will continue to benefit from skilled PT services to modify and progress therapeutic interventions, address functional mobility deficits, address ROM deficits, address strength deficits, analyze and modify body mechanics/ergonomics, assess and modify postural abnormalities and instruct in home and community integration to attain remaining goals. Progress towards goals / Updated goals: 6/26/18   New Goals to be achieved in __4__  weeks:  1. Pt will be able to ambulate . 25 mile on TM with right ankle brace without c/o right ankle pain/LOB/rest break in order to return to community ambulation safely   2. Pt will be able to get in/out of his car without use of SC without c/o right ankle pain in order to get to/from therapy more easily   3.   Pt will be able to negotiate 12, 6in stairs with left rail only ascending and right descending with reciprocal gait in order to more easily access the second floor of his home   4.            PLAN  [x]  Upgrade activities as tolerated     [x]  Continue plan of care  []  Update interventions per flow sheet       []  Discharge due to:_  [x]  Other: assess goals next session    Zeferino Moy DPT 7/19/2018  1029 AM    Future Appointments  Date Time Provider Twila Hale   7/24/2018 9:00 AM Zeferino Moy DPT [facility]   7/24/2018 1:30 PM Tee Perrin PA-C [facility]   7/26/2018 9:00 AM Zeferino Moy DPT [facility]   7/31/2018 9:00 AM Juvencio Butt PTA [facility]

## 2018-07-24 ENCOUNTER — OFFICE VISIT (OUTPATIENT)
Dept: ORTHOPEDIC SURGERY | Facility: CLINIC | Age: 48
End: 2018-07-24

## 2018-07-24 ENCOUNTER — HOSPITAL ENCOUNTER (OUTPATIENT)
Dept: PHYSICAL THERAPY | Age: 48
Discharge: HOME | End: 2018-07-24
Payer: COMMERCIAL

## 2018-07-24 VITALS
HEART RATE: 80 BPM | SYSTOLIC BLOOD PRESSURE: 137 MMHG | TEMPERATURE: 97.8 F | DIASTOLIC BLOOD PRESSURE: 81 MMHG | WEIGHT: 248 LBS | RESPIRATION RATE: 16 BRPM | OXYGEN SATURATION: 96 % | BODY MASS INDEX: 30.84 KG/M2 | HEIGHT: 75 IN

## 2018-07-24 DIAGNOSIS — M89.8X6 PAIN OF RIGHT TIBIA: ICD-10-CM

## 2018-07-24 DIAGNOSIS — Z87.81: Primary | ICD-10-CM

## 2018-07-24 DIAGNOSIS — S82.251D CLOSED DISPLACED COMMINUTED FRACTURE OF SHAFT OF RIGHT TIBIA WITH ROUTINE HEALING, SUBSEQUENT ENCOUNTER: ICD-10-CM

## 2018-07-24 DIAGNOSIS — Z87.81 H/O FRACTURE OF TIBIA: ICD-10-CM

## 2018-07-24 PROCEDURE — 97110 THERAPEUTIC EXERCISES: CPT | Performed by: PHYSICAL THERAPIST

## 2018-07-24 PROCEDURE — 97016 VASOPNEUMATIC DEVICE THERAPY: CPT | Performed by: PHYSICAL THERAPIST

## 2018-07-24 NOTE — PROGRESS NOTES
Patient: Ijeoma Suero                MRN: [id number]       SSN: [id number]  YOB: 1970        AGE: 46 y.o. SEX: male  Body mass index is 31 kg/(m^2). PCP: Ronny Lehman NP  07/24/18 07/24/18: Full weight bearing Right lower extremity. Continuing PT. Still feels weak in Right leg, wishes to continue PT. Working from [employer], current employment ending inside month, severance package will be in effect for 6 months. 05/23/18: Wearing boot, and crutches, pain lateral ankle, WBAT. Working from [employer]. In PT current. 05/02/18: Patient just started PT OP, with boot he is 2/3 weight bearing. Using crutches for ambulation. Pain improved. Chief Complaint: No changes from previous visit complaint. HISTORY OF PRESENT ILLNESS: Mr. Nabeel Weaver returns today for follow-up of his right tib/fib fracture. He is still having some mobility issues. He is using his crutch and is getting around better. He continues to report some pain in the right knee as well as some pain in the right tibia area. He has not had any falls or other injuries. Past Medical History:   Diagnosis Date    Thyroid disease        No family history on file. Current Outpatient Prescriptions   Medication Sig Dispense Refill    ibuprofen (MOTRIN) 800 mg tablet Take 1 Tab by mouth every six (6) hours as needed for Pain. 60 Tab 2    acetaminophen (TYLENOL) 325 mg tablet Take  by mouth every four (4) hours as needed for Pain.  levothyroxine (SYNTHROID) 75 mcg tablet Take  by mouth Daily (before breakfast). No Known Allergies    Past Surgical History:   Procedure Laterality Date    HX ANKLE FRACTURE TX         Social History     Social History    Marital status:      Spouse name: N/A    Number of children: N/A    Years of education: N/A     Occupational History    Not on file.      Social History Main Topics    Smoking status: Former Smoker    Smokeless tobacco: Never Used    Alcohol use No    Drug use: No    Sexual activity: Not on file     Other Topics Concern    Not on file     Social History Narrative       REVIEW OF SYSTEMS:      No changes from previous review of systems unless noted. PHYSICAL EXAMINATION:  Visit Vitals    /81    Pulse 80    Temp 97.8 °F (36.6 °C) (Oral)    Resp 16    Ht 6' 3\" (1.905 m)    Wt 248 lb (112.5 kg)    SpO2 96%    BMI 31 kg/m2     Body mass index is 31 kg/(m^2). GENERAL: Alert and oriented x3, in no acute distress. HEENT: Normocephalic, atraumatic. RESP: Non labored breathing. SKIN: No rashes or lesions noted. PHYSICAL EXAMINATION:  APhysical examination of the right leg reveals healed surgical incisions. There is some trace mild swelling about the distal tibia as well as the foot and ankle on the right side. There is no erythema. Right LE sx sites healed. There is no calf tenderness to palpation. He is neurovascularly intact distally. Trace point tender lateral inferior malleolus. Plantar flexion (A) 15 degrees and (A) Dorsiflexion 10 degrees. (-) anterior draw sign. Motor Quad / Ham (A) against resistance 4-/5. (A)  degrees minus 5 degrees with patella tracking midline with crepitus. RADIOGRAPHS:  X-rays, two views of the right tibia/fibula were taken today. They show continued  interval healing of the tibia fracture with a nail in place and good alignment and position. There is also acceptable alignment of comminuted fibula fracture as previously noted. Moderate callous inlay continues associated with fibular fracture site. ASSESSMENT/PLAN:  Mr. Alfredito Urena is a 53-year-old male now three months out from his right tib/fib fracture and tibia IM nail. Continued PT would be adventageous continue full weight bearing. .  We will see him back in three weeks. X rays reviewed and all questions answered to his satisfaction.           Electronically signed by: Ventura De La Rosa PA-C

## 2018-07-24 NOTE — PROGRESS NOTES
PT DAILY TREATMENT NOTE     Patient Name: Pilo Wright  Date:2018  : 1970  [x]  Patient  Verified  Payor: Christiana Candelaria / Plan: Florence Ricardo / Product Type: HMO /    In time: 0 Out time:1016  Total Treatment Time (min): 74  1:1 time 38  Visit #: 5 of     Treatment Area: Fracture of tibia [S24.118E]    SUBJECTIVE  Pain Level IN: (0-10 scale): 2-3/10 - knee  Pain Level OUT: (0-10 scale) post treatment: 2/10    Any medication changes, allergies to medications, adverse drug reactions, diagnosis change, or new procedure performed?: [x] No    [] Yes (see summary sheet for update)  Subjective functional status/changes:   [x] No changes reported  My knee is hurting today and I am not sure why?       OBJECTIVE    Modality rationale: decrease edema, decrease inflammation and decrease pain to improve the patients ability to weight bear into the (R) leg    Min Type Additional Details    [] Estim:  []Unatt       []IFC  []Premod                        []Other:  []w/ice   []w/heat  Position:  Location:    [] Estim: []Att    []TENS instruct  []NMES                    []Other:  []w/US   []w/ice   []w/heat  Position:  Location:    []  Traction: [] Cervical       []Lumbar                       [] Prone          []Supine                       []Intermittent   []Continuous Lbs:  [] before manual  [] after manual    []  Ultrasound: []Continuous   [] Pulsed                           []1MHz   []3MHz W/cm2:  Location:    []  Iontophoresis with dexamethasone         Location: [] Take home patch   [] In clinic    []  Ice     []  heat  []  Ice massage  []  Laser   []  Anodyne Position:  Location:    []  Laser with stim  []  Other:  Position:  Location:   15 [x]  Vasopneumatic Device - to (R) leg and (R) foot/ankle Pressure:       [] lo [x] med [] hi   Temperature: [x] lo [] med [] hi   [] Skin assessment post-treatment:  []intact []redness- no adverse reaction    []redness  adverse reaction:       59 min Therapeutic Exercise:  [x] See flow sheet : progressed reps per flow sheet         Rationale: increase ROM, increase strength, improve coordination, improve balance and increase proprioception to improve the patients ability to ambulate with normal gait pattern     With   [] TE   [] TA   [] neuro   [] other: Patient Education: [x] Review HEP    [] Progressed/Changed HEP based on:   [] positioning   [] body mechanics   [] transfers   [] heat/ice application    [] Graston Education: Explained the effects and benefits of Graston Technique therapy including potential for post treatment soreness and bruising. [] Other:       Objective/Functional Measures with Therapist Assessment Noted with Response to Therapy Session:  Pt able to ambulate without AD demonstrating decreased heel-toe patten on the R and decreased step length on the L  Stair negotiation: noted decreased eccentric control in the R quad with descent. ASSESSMENT/Changes in Function:   Pt continues to present with abnormal gait mechanics secondary to weakness in the R foot. Pt also presents with decreased eccentric quad strength limiting descending stairs. Pt would benefit from continued therapy without stability brace to improve strengthening and return to PLOF symptom free. Patient will continue to benefit from skilled PT services to modify and progress therapeutic interventions, address functional mobility deficits, address ROM deficits, address strength deficits, analyze and modify body mechanics/ergonomics, assess and modify postural abnormalities and instruct in home and community integration to attain remaining goals. Progress towards goals / Updated goals: 6/26/18   New Goals to be achieved in __4__  weeks:  1. Pt will be able to ambulate . 25 mile on TM with right ankle brace without c/o right ankle pain/LOB/rest break in order to return to community ambulation safely   2.   Pt will be able to get in/out of his car without use of SC without c/o right ankle pain in order to get to/from therapy more easily met- no AD 7/24/18   3.   Pt will be able to negotiate 12, 6in stairs with left rail only ascending and right descending with reciprocal gait in order to more easily access the second floor of his home     PLAN  [x]  Upgrade activities as tolerated     [x]  Continue plan of care  []  Update interventions per flow sheet       []  Discharge due to:_  []  Other:     Kemi Barrios DPT 7/24/2018  132 PM    Future Appointments  Date Time Provider Twila Hale   7/24/2018 1:30 PM CARLO Velasco [address]   7/26/2018 9:00 AM Kemi Barrios DPT [facility]   7/31/2018 9:00 AM TAQUERIA Vilchis

## 2018-07-26 ENCOUNTER — HOSPITAL ENCOUNTER (OUTPATIENT)
Dept: PHYSICAL THERAPY | Age: 48
Discharge: HOME | End: 2018-07-26
Payer: COMMERCIAL

## 2018-07-26 PROCEDURE — 97110 THERAPEUTIC EXERCISES: CPT | Performed by: PHYSICAL THERAPIST

## 2018-07-26 PROCEDURE — 97140 MANUAL THERAPY 1/> REGIONS: CPT | Performed by: PHYSICAL THERAPIST

## 2018-07-26 NOTE — PROGRESS NOTES
[address] [facility] AT [facility]  [address] [place] [address],[address], [place], [place]  Phone [phone]  Fax [phone]    PROGRESS NOTE  Patient Name: Cyndie Nunes : 1970   Treatment/Medical Diagnosis: Fracture of tibia [S82.209A]   Referral Source: Bert Polanco MD     Date of Initial Visit: 18 Attended Visits: 23 Missed Visits: 0   Comorbidities: thyroid problems    SUMMARY OF TREATMENT  Patient's POC has consisted of therex, therapeutic activities, manual therapy prn, modalities prn, NM re-ed, pt. education, and a comprehensive HEP. Treatment strategies used to address functional mobility deficits, ROM deficits, strength deficits, analyze and address soft tissue restrictions, analyze and cue movement patterns, analyze and modify body mechanics/ergonomics, assess and modify postural abnormalities and instruct in home and community integration. CURRENT STATUS   Pt reports 60% improvement since [facility]. He reports improvements in tolerance to full weight-bear and balance. He is now able to ambulate without AD. Functional limitations include SL balance, pain in the lateral leg/ankle, prolonged ambulation as well as prolonged standing, stair negotiation. He notes that he would like to continue PT to further progress to improve overall activity tolerance without pain. Upon reassessment, pt continues to present with R myofascial restrictions throughout the R LE, decreased R ankle ROM and strength leading to abnormalities in gait and reduced activity tolerance. Pt would benefit from a course of skilled PT to further address these deficits to return to PLOF symptom free. Pt continues to have bone protrusions that are not fully healed in the R fibula per recent imaging leading to increase reports of pain limiting progression through therapy.  Will continue to monitor.       Pt able to ambulate without AD demonstrating decreased heel-toe patten on the R and decreased step length on the L  Stair negotiation: noted decreased eccentric control in the R quad with descent. AROM on the R ankle: DF: 0 deg, PF: 40 deg, EV: 0 deg, IN: 20 deg  SL stance on the R: 8 seconds  SL HR on the R: able to perform 1 with 50% motion  Noted myofascial restrictions throughout the R Gastroc/Soleus/Peroneals-reduced with myofascial cupping  FOTO:       GOALS AS OF EVALUATION/PREVIOUS PROGRESS NOTE:  1.  Pt will be able to ambulate . 25 mile on TM with right ankle brace without c/o right ankle pain/LOB/rest break in order to return to community ambulation safely progressing, ambulated 5 min on TM without brace with 2/10 pain 7/26/18   2.  Pt will be able to get in/out of his car without use of SC without c/o right ankle pain in order to get to/from therapy more easily met- no AD 7/24/18   3.  Pt will be able to negotiate 12, 6in stairs with left rail only ascending and right descending with reciprocal gait in order to more easily access the second floor of his home progressing 7/26/18       New Goals to be achieved in __4__  weeks:  1. Pt will be able to improve R PF strength to 5/5 as evident by ability to perform 25 SL HR to improve functional gait pattern. 2. Pt will improve functional DF to be able to perform a 6\" step down to improve stair negotiation. 3. Pt will be able demonstrating normalized gait pattern and decreased pain to improve overall activity tolerance on unlevel surfaces. RECOMMENDATIONS  Continue with POC 2xwk for 4wks to work on achieving the above goals and returning to PLOF. If you have any questions/comments please contact us directly at [phone]. Thank you for allowing us to assist in the care of your patient.     Therapist Signature: John Casillas DPT Date: 9/72/0576   Certification Period:    Reporting period Medicare only      Time: 12:57 PM   NOTE TO PHYSICIAN:  PLEASE COMPLETE THE ORDERS BELOW AND FAX TO   [facility]: [phone]. If you are unable to process this request in 24 hours please contact our office: [phone].    ___ I have read the above report and request that my patient continue as recommended.   ___ I have read the above report and request that my patient continue therapy with the following changes/special instructions:_________________________________________________________   ___ I have read the above report and request that my patient be discharged from therapy.      Physician Signature:        Date:       Time:

## 2018-07-26 NOTE — PROGRESS NOTES
PT DAILY TREATMENT NOTE     Patient Name: Zohreh Phan  Date:2018  : 1970  [x]  Patient  Verified  Payor: Latosha Hull / Plan: Dash Robles / Product Type: HMO /    In time: 130 Out time:1002  Total Treatment Time (min): 54  1:1 time 40  Visit #: 6 of     Treatment Area: Fracture of tibia [S87.322I]    SUBJECTIVE  Pain Level IN: (0-10 scale): 2-3/10 - knee  Pain Level OUT: (0-10 scale) post treatment: 2/10    Any medication changes, allergies to medications, adverse drug reactions, diagnosis change, or new procedure performed?: [x] No    [] Yes (see summary sheet for update)  Subjective functional status/changes:   [x] No changes reported  Pt reports 60% improvement since SOC. He reports improvements in tolerance to full weight-bear and balance. He is now able to ambulate without AD. Functional limitations include SL balance, pain in the lateral leg/ankle, prolonged ambulation as well as prolonged standing, stair negotiation. He notes that he would like to continue PT to further progress to improve overall activity tolerance without pain.        OBJECTIVE    Modality rationale: decrease edema, decrease inflammation and decrease pain to improve the patients ability to weight bear into the (R) leg    Min Type Additional Details    [] Estim:  []Unatt       []IFC  []Premod                        []Other:  []w/ice   []w/heat  Position:  Location:    [] Estim: []Att    []TENS instruct  []NMES                    []Other:  []w/US   []w/ice   []w/heat  Position:  Location:    []  Traction: [] Cervical       []Lumbar                       [] Prone          []Supine                       []Intermittent   []Continuous Lbs:  [] before manual  [] after manual    []  Ultrasound: []Continuous   [] Pulsed                           []1MHz   []3MHz W/cm2:  Location:    []  Iontophoresis with dexamethasone         Location: [] Take home patch   [] In clinic   10 []  Ice     [x]  heat  []  Ice massage  [] Laser   []  Anodyne Position: prone  Location: R gastroc    []  Laser with stim  []  Other:  Position:  Location:   H [x]  Vasopneumatic Device - to (R) leg and (R) foot/ankle Pressure:       [] lo [x] med [] hi   Temperature: [x] lo [] med [] hi   [] Skin assessment post-treatment:  []intact []redness- no adverse reaction    []redness  adverse reaction:       34 min Therapeutic Exercise:  [x] See flow sheet : PT reassessment       Rationale: increase ROM, increase strength, improve coordination, improve balance and increase proprioception to improve the patients ability to ambulate with normal gait pattern     10 min Manual Therapy: Technique:      Myofascial cupping to the R Gastroc/Peroneals   Rationale:      decrease pain, increase ROM, increase tissue extensibility and decrease trigger points to improve patient's ability to ambulate and return to normal ADLs    With   [] TE   [] TA   [] neuro   [] other: Patient Education: [x] Review HEP    [] Progressed/Changed HEP based on:   [] positioning   [] body mechanics   [] transfers   [] heat/ice application    [] Graston Education: Explained the effects and benefits of Graston Technique therapy including potential for post treatment soreness and bruising. [] Other:       Objective/Functional Measures with Therapist Assessment Noted with Response to Therapy Session:  Pt able to ambulate without AD demonstrating decreased heel-toe patten on the R and decreased step length on the L  Stair negotiation: noted decreased eccentric control in the R quad with descent.    AROM on the R ankle: DF: 0 deg, PF: 40 deg, EV: 0 deg, IN: 20 deg  SL stance on the R: 8 seconds  SL HR on the R: able to perform 1 with 50% motion  Noted myofascial restrictions throughout the R Gastroc/Soleus/Peroneals-reduced with myofascial cupping      ASSESSMENT/Changes in Function:   Upon reassessment, pt continues to present with R myofascial restrictions throughout the R LE, decreased R ankle ROM and strength leading to abnormalities in gait and reduced activity tolerance. Pt would benefit from a course of skilled PT to further address these deficits to return to PLOF symptom free. Patient will continue to benefit from skilled PT services to modify and progress therapeutic interventions, address functional mobility deficits, address ROM deficits, address strength deficits, analyze and modify body mechanics/ergonomics, assess and modify postural abnormalities and instruct in home and community integration to attain remaining goals. Progress towards goals / Updated goals: 6/26/18   New Goals to be achieved in __4__  weeks:  1. Pt will be able to ambulate . 25 mile on TM with right ankle brace without c/o right ankle pain/LOB/rest break in order to return to community ambulation safely progressing, ambulated 5 min on TM without brace with 2/10 pain 7/26/18   2. Pt will be able to get in/out of his car without use of SC without c/o right ankle pain in order to get to/from therapy more easily met- no AD 7/24/18   3.   Pt will be able to negotiate 12, 6in stairs with left rail only ascending and right descending with reciprocal gait in order to more easily access the second floor of his home progressing 7/26/18     PLAN  [x]  Upgrade activities as tolerated     [x]  Continue plan of care  []  Update interventions per flow sheet       []  Discharge due to:_  [x]  Other: Continue PT 2x/week for 4 weeks     Juan Jose Freeman DPT 7/26/2018  1248 PM    Future Appointments  Date Time Provider Twila Hale   7/31/2018 9:00 AM Don Lee PTA [facility]   9/5/2018 11:15 AM CARLO [address]

## 2018-07-27 DIAGNOSIS — S82.91XA CLOSED FRACTURE OF RIGHT LOWER LEG, INITIAL ENCOUNTER: Primary | ICD-10-CM

## 2018-07-27 RX ORDER — OXYCODONE AND ACETAMINOPHEN 7.5; 325 MG/1; MG/1
TABLET ORAL
COMMUNITY
Start: 2018-06-11 | End: 2018-07-27 | Stop reason: SDUPTHER

## 2018-07-27 RX ORDER — OXYCODONE AND ACETAMINOPHEN 7.5; 325 MG/1; MG/1
1 TABLET ORAL
Qty: 28 TAB | Refills: 0 | Status: SHIPPED | OUTPATIENT
Start: 2018-07-27 | End: 2018-08-16 | Stop reason: SDUPTHER

## 2018-07-31 ENCOUNTER — HOSPITAL ENCOUNTER (OUTPATIENT)
Dept: PHYSICAL THERAPY | Age: 48
Discharge: HOME | End: 2018-07-31
Payer: COMMERCIAL

## 2018-07-31 PROCEDURE — 97016 VASOPNEUMATIC DEVICE THERAPY: CPT

## 2018-07-31 PROCEDURE — 97110 THERAPEUTIC EXERCISES: CPT

## 2018-07-31 NOTE — PROGRESS NOTES
PT DAILY TREATMENT NOTE  Patient Name: Rainer Eldridge Date:2018 : 1970 [x]  Patient  Verified Payor: RAFAEL VALDOVINOS / Plan: Lisandro Bryson / Product Type: HMO /   
In time: 11:00 Out time:12:10 Total Treatment Time (min): 70 
1:1 time  20 Visit #: 1 of  Treatment Area: Fracture of tibia [P46.009S] SUBJECTIVE Pain Level IN: (0-10 scale): 1-2/10 - knee Pain Level OUT: (0-10 scale) post treatment: 0/10 Any medication changes, allergies to medications, adverse drug reactions, diagnosis change, or new procedure performed?: [x] No    [] Yes (see summary sheet for update) Subjective functional status/changes:   [x] No changes reported I feel like I am still having more pain to the side of my leg over my fibula area when I put weight on my leg - it is like mid shaft on the leg is where I feel it OBJECTIVE Modality rationale: decrease edema, decrease inflammation and decrease pain to improve the patients ability to weight bear into the (R) leg Min Type Additional Details  
 [] Estim:  []Unatt       []IFC  []Premod []Other:  []w/ice   []w/heat Position: Location:  
 [] Estim: []Att    []TENS instruct  []NMES []Other:  []w/US   []w/ice   []w/heat Position: Location:  
 []  Traction: [] Cervical       []Lumbar 
                     [] Prone          []Supine []Intermittent   []Continuous Lbs: 
[] before manual 
[] after manual  
 []  Ultrasound: []Continuous   [] Pulsed []1MHz   []3MHz W/cm2: 
Location:  
 []  Iontophoresis with dexamethasone Location: [] Take home patch  
[] In clinic  
 []  Ice     [x]  heat 
[]  Ice massage 
[]  Laser  
[]  Anodyne Position: prone Location: R gastroc  
 []  Laser with stim 
[]  Other:  Position: Location:  
15 [x]  Vasopneumatic Device -  (R) foot/ankle Pressure:       [] lo [x] med [] hi  
Temperature: [x] lo [] med [] hi  
[] Skin assessment post-treatment:  []intact []redness- no adverse reaction 
  []redness  adverse reaction:  
 
 
55 min Therapeutic Exercise:  [x] See flow sheet :  
 
  
Rationale: increase ROM, increase strength, improve coordination, improve balance and increase proprioception to improve the patients ability to ambulate with normal gait pattern HELD min Manual Therapy: Technique:     
Myofascial cupping to the R Gastroc/Peroneals Rationale:      decrease pain, increase ROM, increase tissue extensibility and decrease trigger points to improve patient's ability to ambulate and return to normal ADLs With 
 [] TE 
 [] TA 
 [] neuro 
 [] other: Patient Education: [x] Review HEP [] Progressed/Changed HEP based on:  
[] positioning   [] body mechanics   [] transfers   [] heat/ice application [] Graston Education: Explained the effects and benefits of Graston Technique therapy including potential for post treatment soreness and bruising. [] Other:  
 
 
Objective/Functional Measures with Therapist Assessment Noted with Response to Therapy Session: 
Patient requires verbal and tactile cuing to perform proper eccentric step downs on 4\" and with forward and backwards lunges - patient demonstrates weakness to entire leg with these exercise and visible muscle fatigue and shaking ASSESSMENT/Changes in Function:  
 Patient will continue to benefit from skilled PT services to modify and progress therapeutic interventions, address functional mobility deficits, address ROM deficits, address strength deficits, analyze and modify body mechanics/ergonomics, assess and modify postural abnormalities and instruct in home and community integration to attain remaining goals. Progress towards goals / Updated goals: 7/26/2018 New Goals to be achieved in __4__  weeks: 1. Pt will be able to improve R PF strength to 5/5 as evident by ability to perform 25 SL HR to improve functional gait pattern.  
2. Pt will improve functional DF to be able to perform a 6\" step down to improve stair negotiation. 3. Pt will be able demonstrating normalized gait pattern and decreased pain to improve overall activity tolerance on unlevel surfaces.  
  
[x]  Upgrade activities as tolerated     [x]  Continue plan of care 
[]  Update interventions per flow sheet      
[]  Discharge due to:_ 
[]  Other:  
 
Tierra Schaeffer PTA 7/31/2018  1248 PM 
 
Future Appointments Date Time Provider Twila Hale 8/2/2018 4:00 PM [place] [facility]  
8/7/2018 9:00 AM Tierra Schaeffer PTA [facility]  
8/9/2018 9:00 AM [address]T [facility]  
8/14/2018 9:00 AM [address]T [facility]  
8/16/2018 9:00 AM [address]T [facility]  
8/21/2018 9:00 AM Lluvia Ambriz, [facility]  
8/23/2018 9:00 AM Lluvia Ambriz, [facility]  
8/28/2018 9:00 AM Lluvia Ambriz, [facility]  
8/30/2018 9:00 AM [address] [facility]  
9/5/2018 11:15 AM CARLO Mary

## 2018-08-01 ENCOUNTER — OFFICE VISIT (OUTPATIENT)
Dept: ORTHOPEDIC SURGERY | Facility: CLINIC | Age: 48
End: 2018-08-01

## 2018-08-01 VITALS
SYSTOLIC BLOOD PRESSURE: 131 MMHG | DIASTOLIC BLOOD PRESSURE: 91 MMHG | BODY MASS INDEX: 30.88 KG/M2 | OXYGEN SATURATION: 96 % | WEIGHT: 248.4 LBS | HEART RATE: 92 BPM | HEIGHT: 75 IN | TEMPERATURE: 97.7 F | RESPIRATION RATE: 16 BRPM

## 2018-08-01 DIAGNOSIS — S82.251D CLOSED DISPLACED COMMINUTED FRACTURE OF SHAFT OF RIGHT TIBIA WITH ROUTINE HEALING, SUBSEQUENT ENCOUNTER: Primary | ICD-10-CM

## 2018-08-01 DIAGNOSIS — S82.451D CLOSED DISPLACED COMMINUTED FRACTURE OF SHAFT OF RIGHT FIBULA WITH ROUTINE HEALING, SUBSEQUENT ENCOUNTER: ICD-10-CM

## 2018-08-01 NOTE — PROGRESS NOTES
Patient: Israel Verduzco                MRN: [id number]       SSN: [id number] YOB: 1970        AGE: 46 y.o. SEX: male Body mass index is 31.05 kg/(m^2). PCP: Nohelia Bray NP 
08/01/18 Chief Complaint: Right leg follow up HISTORY OF PRESENT ILLNESS:  Angel Krishnamurthy returns to the office today for his right leg. He saw SUDHEER Horan January last week. He had some remaining questions. He has been increasing his physical therapy and walking. He has noted some pain over this distal fibula, but overall, he is doing well. He has no new complaints. Past Medical History:  
Diagnosis Date  Thyroid disease No family history on file. Current Outpatient Prescriptions Medication Sig Dispense Refill  ibuprofen (MOTRIN) 800 mg tablet Take 1 Tab by mouth every six (6) hours as needed for Pain. 60 Tab 2  
 levothyroxine (SYNTHROID) 75 mcg tablet Take  by mouth Daily (before breakfast).  oxyCODONE-acetaminophen (PERCOCET 7.5) 7.5-325 mg per tablet Take 1 Tab by mouth every eight (8) hours as needed for Pain. Max Daily Amount: 3 Tabs. 28 Tab 0  
 acetaminophen (TYLENOL) 325 mg tablet Take  by mouth every four (4) hours as needed for Pain. No Known Allergies Past Surgical History:  
Procedure Laterality Date [address] [facility] Social History Social History  Marital status:  Spouse name: N/A  
 Number of children: N/A  
 Years of education: N/A Occupational History  Not on file. Social History Main Topics  Smoking status: Former Smoker  Smokeless tobacco: Never Used  Alcohol use No  
 Drug use: No  
 Sexual activity: Not on file Other Topics Concern  Not on file Social History Narrative REVIEW OF SYSTEMS:   
 
No changes from previous review of systems unless noted. PHYSICAL EXAMINATION: 
Visit Vitals  BP (!) 131/91  Pulse 92  Temp 97.7 °F (36.5 °C) (Oral)  Resp 16  
 Ht 6' 3\" (1.905 m)  Wt 248 lb 6.4 oz (112.7 kg)  SpO2 96%  BMI 31.05 kg/m2 Body mass index is 31.05 kg/(m^2). GENERAL: Alert and oriented x3, in no acute distress. HEENT: Normocephalic, atraumatic. RESP: Non labored breathing. SKIN: No rashes or lesions noted. PHYSICAL EXAMINATION:   Physical exam of the right leg reveals healed surgical incisions. He is nontender over the tibia. He is nontender over the fibula, including over the fracture site. There is no obvious deformity. He is neurovascularly intact distally. He has full knee range of motion. IMAGING:  X-rays from July 24, 2018, were reviewed in the office today. These show a healing tibia fracture with one area anteriorly where it does not appear to be fully healed, as well as one area medially. The fibula fracture is apparent, but there is callus formation. ASSESSMENT/PLAN:  Jarvis Lewis is now over six months out from his right tib/fib fracture. I think it is healing well. It is progressing. It is a little slower than he or I would both like, but he is recovering. I told him since he is not having any pain over his fibula, and clinically his fractures are healing, if he can forget about the fact that the x-ray does not look perfect, I think he will recover well. He can continue with his therapy and work on increasing his activities as tolerated. I will see him back as needed.   
 
 
 
Electronically signed by: Sherry Olivarez MD

## 2018-08-02 ENCOUNTER — HOSPITAL ENCOUNTER (OUTPATIENT)
Dept: PHYSICAL THERAPY | Age: 48
Discharge: HOME | End: 2018-08-02
Payer: COMMERCIAL

## 2018-08-02 PROCEDURE — 97110 THERAPEUTIC EXERCISES: CPT

## 2018-08-02 PROCEDURE — 97016 VASOPNEUMATIC DEVICE THERAPY: CPT

## 2018-08-02 NOTE — PROGRESS NOTES
PT DAILY TREATMENT NOTE  Patient Name: Karuna Jimenez Date:2018 : 1970 [x]  Patient  Verified Payor: BLUE [employer]OSS / Plan: Satnam Donaldson / Product Type: HMO /   
In time:408  Out time:528 Total Treatment Time (min): 80 Total Timed Codes (min): 65 
1:1 Treatment Time (min): 50 Visit #: 2 of 8 Treatment Area: Fracture of tibia [S81.594X] SUBJECTIVE Pain Level (0-10 scale): 2/10 Any medication changes, allergies to medications, adverse drug reactions, diagnosis change, or new procedure performed?: [x] No    [] Yes (see summary sheet for update) Subjective functional status/changes:   [] No changes reported I saw the doctor and they took Xrays and they were not concerned. He said it will take time for the pain to be gone completely OBJECTIVE Modality rationale: decrease inflammation and decrease pain to improve the patients ability to assist in normalizing gait pattern Min Type Additional Details  
 [] Estim: []Att   []Unatt        []TENS instruct []IFC  []Premod   []NMES []Other:  []w/US   []w/ice   []w/heat Position: Location:  
 []  Traction: [] Cervical       []Lumbar 
                     [] Prone          []Supine []Intermittent   []Continuous Lbs: 
[] before manual 
[] after manual  
 []  Ultrasound: []Continuous   [] Pulsed []1MHz   []3MHz Location: 
W/cm2:  
 []  Iontophoresis with dexamethasone Location: [] Take home patch  
[] In clinic  
 []  Ice     []  heat 
[]  Ice massage Position: Location:  
15+5 set up  [x]  Vasopneumatic Device Pressure:       [] lo [x] med [] hi  
Temperature: [x] lo [] med [] hi  
[x] Skin assessment post-treatment:  [x]intact []redness- no adverse reaction 
     []redness  adverse reaction:  
 
 
60/55 min Therapeutic Exercise:  [x] See flow sheet : 5 min NC for warm up . 50 min 1:1 Rationale: increase strength, improve balance and increase proprioception to improve the patients ability to normalize gait  
       
 min Patient Education: [x] Review HEP    [] Progressed/Changed HEP based on:  
[] positioning   [] body mechanics   [] transfers   [] heat/ice application Other Objective/Functional Measures:  
Patient able to tolerate SLS x 6 seconds Pt tolerated eccentric heel raises fair with noted muscular fatigue in gastrocs and quadriceps. Pt continues to demonstrate difficulty with static and dynamic lunges. Pain Level (0-10 scale) post treatment: 1/10 ASSESSMENT/Changes in Function: Pt is progressing slowly towards goals with continued weakness noted in quadriceps and gastrics of involved extremity. Patient will continue to benefit from skilled PT services to address functional mobility deficits, address ROM deficits and address strength deficits to attain remaining goals. []  See Plan of Care 
[]  See progress note/recertification 
[]  See Discharge Summary Progress towards goals / Updated goals: 
 New Goals to be achieved in __4__  weeks: 1. Pt will be able to improve R PF strength to 5/5 as evident by ability to perform 25 SL HR to improve functional gait pattern. Slowly progressing, difficulty performing s/l eccentric lowering. 8/2/18 HLL 2. Pt will improve functional DF to be able to perform a 6\" step down to improve stair negotiation. 3. Pt will be able demonstrating normalized gait pattern and decreased pain to improve overall activity tolerance on unlevel surfaces.  
  
 
PLAN 
[]  Upgrade activities as tolerated     [x]  Continue plan of care 
[]  Update interventions per flow sheet      
[]  Discharge due to:_ 
[]  Other:_ Luna Cheney, PT 8/2/2018  3:59 PM

## 2018-08-07 ENCOUNTER — HOSPITAL ENCOUNTER (OUTPATIENT)
Dept: PHYSICAL THERAPY | Age: 48
Discharge: HOME | End: 2018-08-07
Payer: COMMERCIAL

## 2018-08-07 PROCEDURE — 97140 MANUAL THERAPY 1/> REGIONS: CPT

## 2018-08-07 PROCEDURE — 97110 THERAPEUTIC EXERCISES: CPT

## 2018-08-07 PROCEDURE — 97016 VASOPNEUMATIC DEVICE THERAPY: CPT

## 2018-08-07 NOTE — PROGRESS NOTES
PT DAILY TREATMENT NOTE     Patient Name: Haritha Jones  Date:2018  : 1970  [x]  Patient  Verified  Payor: Bessy Thakur / Plan: Thierry Calderón / Product Type: HMO /    In time:9:05  Out time:10:17  Total Treatment Time (min): 72  Total Timed Codes (min): 57  1:1 Treatment Time (min): 30  Visit #: 3 of 8    Treatment Area: Fracture of tibia [S87.792M]    SUBJECTIVE  Pain Level (0-10 scale): 1-2/10  Any medication changes, allergies to medications, adverse drug reactions, diagnosis change, or new procedure performed?: [x] No    [] Yes (see summary sheet for update)  Subjective functional status/changes:   [] No changes reported  It does not hurt like it did when I decompression off the foot and ankle like it did before - now it is a combination of weakness and like something feels blocked when I try to do like a lunge or a good step forward     OBJECTIVE  Modality rationale: decrease inflammation and decrease pain to improve the patients ability to assist in normalizing gait pattern    Min Type Additional Details    [] Estim: []Att   []Unatt        []TENS instruct                  []IFC  []Premod   []NMES                     []Other:  []w/US   []w/ice   []w/heat  Position:  Location:    []  Traction: [] Cervical       []Lumbar                       [] Prone          []Supine                       []Intermittent   []Continuous Lbs:  [] before manual  [] after manual    []  Ultrasound: []Continuous   [] Pulsed                           []1MHz   []3MHz Location:  W/cm2:    []  Iontophoresis with dexamethasone         Location: [] Take home patch   [] In clinic    []  Ice     []  heat  []  Ice massage Position:  Location:   15  [x]  Vasopneumatic Device - ankle and leg  Pressure:       [] lo [x] med [] hi   Temperature: [x] lo [] med [] hi   [x] Skin assessment post-treatment:  [x]intact []redness- no adverse reaction       []redness  adverse reaction:       42 min Therapeutic Exercise:  [x] See flow sheet :   Rationale: increase strength, improve balance and increase proprioception to improve the patients ability to normalize gait     15 min Manual Therapy: Technique:      [x] S/DTM []IASTM [x]PROM [x] Passive Stretching   [x] Graston:  [] GT 1  [] GT 2 [x] GT 3 [] GT 4 [] GT 4 [] GT 5  [] GT 6  [] Sweep [] Fan [] [place]  [] Brush   []  Swivel []J- Stroke [] Scoop []IFraming     []Manual TPR  [] TDN (see objective; actual needle insertion time not billed)  [x]Jt manipulation:Gr I [] II []  III [] IV[x] V[]  Treatment/Area:RLE subtalor joint mobs in standing to increase tibia/fib movement over talus and in prone/supine mob to increase motion    Rationale:      decrease pain, increase ROM, increase tissue extensibility and decrease trigger points to improve patient's ability to ambulate and return to normal ADLs                      min Patient Education: [x] Review HEP    [] Progressed/Changed HEP based on:   [] positioning   [] body mechanics   [] transfers   [] heat/ice application        Other Objective/Functional Measures:   SLS - 7 sec (1st)  and 14 sec (2nd) trial  Patient presents with joint limitation/block with attempting to perform lunge secondary to decrease movement of tib/fib over the talus  Performed subtalor joint mobs in standing to increase tibia/fib movement over talus and in prone/supine mob to increase motion     Pain Level (0-10 scale) post treatment: 1/10    ASSESSMENT/Changes in Function: . Patient will continue to benefit from skilled PT services to address functional mobility deficits, address ROM deficits and address strength deficits to attain remaining goals. []  See Plan of Care  []  See progress note/recertification  []  See Discharge Summary         Progress towards goals / Updated goals:   New Goals to be achieved in __4__  weeks:  1. Pt will be able to improve R PF strength to 5/5 as evident by ability to perform 25 SL HR to improve functional gait pattern.  Slowly progressing, difficulty performing s/l eccentric lowering. 8/2/18 HLL  2. Pt will improve functional DF to be able to perform a 6\" step down to improve stair negotiation.   3. Pt will be able demonstrating normalized gait pattern and decreased pain to improve overall activity tolerance on unlevel surfaces.        PLAN  []  Upgrade activities as tolerated     [x]  Continue plan of care  []  Update interventions per flow sheet       []  Discharge due to:_  []  Other:_      Holly Ruelas, [place] 8/7/2018  3:59 PM

## 2018-08-09 ENCOUNTER — HOSPITAL ENCOUNTER (OUTPATIENT)
Dept: PHYSICAL THERAPY | Age: 48
Discharge: HOME | End: 2018-08-09
Payer: COMMERCIAL

## 2018-08-09 PROCEDURE — 97016 VASOPNEUMATIC DEVICE THERAPY: CPT | Performed by: PHYSICAL THERAPIST

## 2018-08-09 PROCEDURE — 97110 THERAPEUTIC EXERCISES: CPT | Performed by: PHYSICAL THERAPIST

## 2018-08-09 NOTE — PROGRESS NOTES
PT DAILY TREATMENT NOTE     Patient Name: Iraj Lopez  Date:2018  : 1970  [x]  Patient  Verified  Payor: Fidelina Scott / Plan: Mario Patrick / Product Type: HMO /    In time:9:03  Out time:1011  Total Treatment Time (min): 68  Total Timed Codes (min): 53  1:1 Treatment Time (min): 30  Visit #: 4 of 8    Treatment Area: Fracture of tibia [S87.660X]    SUBJECTIVE  Pain Level (0-10 scale): 1-2/10  Any medication changes, allergies to medications, adverse drug reactions, diagnosis change, or new procedure performed?: [x] No    [] Yes (see summary sheet for update)  Subjective functional status/changes:   [] No changes reported      OBJECTIVE  Modality rationale: decrease inflammation and decrease pain to improve the patients ability to assist in normalizing gait pattern    Min Type Additional Details    [] Estim: []Att   []Unatt        []TENS instruct                  []IFC  []Premod   []NMES                     []Other:  []w/US   []w/ice   []w/heat  Position:  Location:    []  Traction: [] Cervical       []Lumbar                       [] Prone          []Supine                       []Intermittent   []Continuous Lbs:  [] before manual  [] after manual    []  Ultrasound: []Continuous   [] Pulsed                           []1MHz   []3MHz Location:  W/cm2:    []  Iontophoresis with dexamethasone         Location: [] Take home patch   [] In clinic    []  Ice     []  heat  []  Ice massage Position:  Location:   15  [x]  Vasopneumatic Device - ankle and leg  Pressure:       [] lo [x] med [] hi   Temperature: [x] lo [] med [] hi   [x] Skin assessment post-treatment:  [x]intact []redness- no adverse reaction       []redness  adverse reaction:       63 min Therapeutic Exercise:  [x] See flow sheet :   Rationale: increase strength, improve balance and increase proprioception to improve the patients ability to normalize gait     H min Manual Therapy: Technique:      [x] S/DTM []IASTM [x]PROM [x] Passive Stretching   [x] Graston:  [] GT 1  [] GT 2 [x] GT 3 [] GT 4 [] GT 4 [] GT 5  [] GT 6  [] Sweep [] Fan [] [place]  [] Brush   []  Swivel []J- Stroke [] Scoop []IFraming     []Manual TPR  [] TDN (see objective; actual needle insertion time not billed)  [x]Jt manipulation:Gr I [] II []  III [] IV[x] V[]  Treatment/Area:RLE subtalor joint mobs in standing to increase tibia/fib movement over talus and in prone/supine mob to increase motion    Rationale:      decrease pain, increase ROM, increase tissue extensibility and decrease trigger points to improve patient's ability to ambulate and return to normal ADLs                      min Patient Education: [x] Review HEP    [] Progressed/Changed HEP based on:   [] positioning   [] body mechanics   [] transfers   [] heat/ice application        Other Objective/Functional Measures:   Pt unable to perform full AROM SL HR  Gait: Limited decreased Heel toe pattern on the R due to PF weakness    Pain Level (0-10 scale) post treatment: 1/10    ASSESSMENT/Changes in Function: . Pt continues to be limited with R PF strength secondary to muscle weakness limiting progress towards LTG #1. Continue to progress as tolerated per current POC. PT educated to add HR to HEP and continue to strengthening the R LE to improve overall activity tolerance and gait pattern. Patient will continue to benefit from skilled PT services to address functional mobility deficits, address ROM deficits and address strength deficits to attain remaining goals. Progress towards goals / Updated goals:   New Goals to be achieved in __4__  weeks:  1. Pt will be able to improve R PF strength to 5/5 as evident by ability to perform 25 SL HR to improve functional gait pattern. Limited 8/9/18  2. Pt will improve functional DF to be able to perform a 6\" step down to improve stair negotiation.   3. Pt will be able demonstrating normalized gait pattern and decreased pain to improve overall activity tolerance on unlevel surfaces. Limited decreased Heel toe pattern on the R due to PF weakness.  8/9/18       PLAN  [x]  Upgrade activities as tolerated     [x]  Continue plan of care  []  Update interventions per flow sheet       []  Discharge due to:_  []  Other:_      Farrell Goldmann, DPT 8/9/2018 1017 AM

## 2018-08-14 ENCOUNTER — HOSPITAL ENCOUNTER (OUTPATIENT)
Dept: PHYSICAL THERAPY | Age: 48
Discharge: HOME | End: 2018-08-14
Payer: COMMERCIAL

## 2018-08-14 PROCEDURE — 97110 THERAPEUTIC EXERCISES: CPT | Performed by: PHYSICAL THERAPIST

## 2018-08-14 PROCEDURE — 97016 VASOPNEUMATIC DEVICE THERAPY: CPT | Performed by: PHYSICAL THERAPIST

## 2018-08-14 NOTE — PROGRESS NOTES
PT DAILY TREATMENT NOTE     Patient Name: Haritha Jones  Date:2018  : 1970  [x]  Patient  Verified  Payor: Bessy Thakur / Plan: Thierry Calderón / Product Type: HMO /    In time:906  Out time:1018  Total Treatment Time (min): 72  1:1 Treatment Time (min):39  Visit #: 5 of 8    Treatment Area: Fracture of tibia [W34.442U]    SUBJECTIVE  Pain Level (0-10 scale): 1-2/10  Any medication changes, allergies to medications, adverse drug reactions, diagnosis change, or new procedure performed?: [x] No    [] Yes (see summary sheet for update)  Subjective functional status/changes:   [] No changes reported  Pt continues to have pain on the lateral aspect of the R lower leg secondary to the heeling fracture.     OBJECTIVE  Modality rationale: decrease inflammation and decrease pain to improve the patients ability to assist in normalizing gait pattern    Min Type Additional Details    [] Estim: []Att   []Unatt        []TENS instruct                  []IFC  []Premod   []NMES                     []Other:  []w/US   []w/ice   []w/heat  Position:  Location:    []  Traction: [] Cervical       []Lumbar                       [] Prone          []Supine                       []Intermittent   []Continuous Lbs:  [] before manual  [] after manual    []  Ultrasound: []Continuous   [] Pulsed                           []1MHz   []3MHz Location:  W/cm2:    []  Iontophoresis with dexamethasone         Location: [] Take home patch   [] In clinic    []  Ice     []  heat  []  Ice massage Position:  Location:   15  [x]  Vasopneumatic Device - ankle and leg  Pressure:       [] lo [x] med [] hi   Temperature: [x] lo [] med [] hi   [x] Skin assessment post-treatment:  [x]intact []redness- no adverse reaction       []redness  adverse reaction:       57 min Therapeutic Exercise:  [x] See flow sheet : added SL HR on TG lvl 11   Rationale: increase strength, improve balance and increase proprioception to improve the patients ability to normalize gait         min Patient Education: [x] Review HEP    [] Progressed/Changed HEP based on:   [] positioning   [] body mechanics   [] transfers   [] heat/ice application        Other Objective/Functional Measures:   Pt unable to perform SL heel raise on R however, added to TG which patient had increased R LE quivering due to muscle weakness  Continues to require VC's for correct lunge technique     Pain Level (0-10 scale) post treatment: 1/10    ASSESSMENT/Changes in Function: . Noted R LE weakness with therex. Pt continues to be significantly challenged therefore, compliance with HEP is questionable and overall progress is slow. Continue to progress as tolerated. Pt may be ready to trial IND HEP for a month to self-manage symptoms IND and FU with PT as needed. Patient will continue to benefit from skilled PT services to address functional mobility deficits, address ROM deficits and address strength deficits to attain remaining goals. Progress towards goals / Updated goals:   New Goals to be achieved in __4__  weeks:  1. Pt will be able to improve R PF strength to 5/5 as evident by ability to perform 25 SL HR to improve functional gait pattern. Limited 8/9/18  2. Pt will improve functional DF to be able to perform a 6\" step down to improve stair negotiation. 3. Pt will be able demonstrating normalized gait pattern and decreased pain to improve overall activity tolerance on unlevel surfaces. Limited decreased Heel toe pattern on the R due to PF weakness.  8/9/18       PLAN  [x]  Upgrade activities as tolerated     [x]  Continue plan of care  []  Update interventions per flow sheet       []  Discharge due to:_  [x]  Other:check goals next session     Bouchra Galloway DPT 8/14/2018 1113  AM

## 2018-08-16 ENCOUNTER — APPOINTMENT (OUTPATIENT)
Dept: PHYSICAL THERAPY | Age: 48
End: 2018-08-16
Payer: COMMERCIAL

## 2018-08-16 DIAGNOSIS — S82.91XA CLOSED FRACTURE OF RIGHT LOWER LEG, INITIAL ENCOUNTER: ICD-10-CM

## 2018-08-17 RX ORDER — IBUPROFEN 800 MG/1
800 TABLET ORAL
Qty: 60 TAB | Refills: 2 | Status: SHIPPED | OUTPATIENT
Start: 2018-08-17 | End: 2018-10-18 | Stop reason: SDUPTHER

## 2018-08-17 RX ORDER — OXYCODONE AND ACETAMINOPHEN 7.5; 325 MG/1; MG/1
1 TABLET ORAL
Qty: 28 TAB | Refills: 0 | Status: SHIPPED | OUTPATIENT
Start: 2018-08-17 | End: 2018-09-20 | Stop reason: SDUPTHER

## 2018-08-21 ENCOUNTER — HOSPITAL ENCOUNTER (OUTPATIENT)
Dept: PHYSICAL THERAPY | Age: 48
Discharge: HOME | End: 2018-08-21
Payer: COMMERCIAL

## 2018-08-21 PROCEDURE — 97016 VASOPNEUMATIC DEVICE THERAPY: CPT

## 2018-08-21 PROCEDURE — 97110 THERAPEUTIC EXERCISES: CPT

## 2018-08-21 NOTE — PROGRESS NOTES
PT DAILY TREATMENT NOTE     Patient Name: Cindi Rae  Date:2018  : 1970  [x]  Patient  Verified  Payor: Terry Constantino / Plan: Triny Sutherland / Product Type: HMO /    In time:3Out time:10:13  Total Treatment Time (min): 70  1:1 Treatment Time (min): 30  Visit #: 6 of 8    Treatment Area: Fracture of tibia [F47.177C]    SUBJECTIVE  Pain Level (0-10 scale): 2/10  Any medication changes, allergies to medications, adverse drug reactions, diagnosis change, or new procedure performed?: [x] No    [] Yes (see summary sheet for update)  Subjective functional status/changes:   [] No changes reported  It doesn't feel like pain as much as it use to but more like weakness in the ankle joint itself     OBJECTIVE  Modality rationale: decrease inflammation and decrease pain to improve the patients ability to assist in normalizing gait pattern    Min Type Additional Details    [] Estim: []Att   []Unatt        []TENS instruct                  []IFC  []Premod   []NMES                     []Other:  []w/US   []w/ice   []w/heat  Position:  Location:    []  Traction: [] Cervical       []Lumbar                       [] Prone          []Supine                       []Intermittent   []Continuous Lbs:  [] before manual  [] after manual    []  Ultrasound: []Continuous   [] Pulsed                           []1MHz   []3MHz Location:  W/cm2:    []  Iontophoresis with dexamethasone         Location: [] Take home patch   [] In clinic    []  Ice     []  heat  []  Ice massage Position:  Location:   15  [x]  Vasopneumatic Device - ankle and leg  Pressure:       [] lo [x] med [] hi   Temperature: [x] lo [] med [] hi   [x] Skin assessment post-treatment:  [x]intact []redness- no adverse reaction       []redness  adverse reaction:       55 min Therapeutic Exercise:  [x] See flow sheet :    Rationale: increase strength, improve balance and increase proprioception to improve the patients ability to normalize gait         min Patient Education: [x] Review HEP    [] Progressed/Changed HEP based on:   [] positioning   [] body mechanics   [] transfers   [] heat/ice application        Other Objective/Functional Measures: SLS on (R) leg 17 sec and 12 sec - firm with EO  GOALS   1. Pt will be able to improve R PF strength to 5/5 as evident by ability to perform 25 SL HR to improve functional gait pattern. PROGRESSING 8/21/18 - 17 SEC 1ST TRIAL AND 12 SEC 2ND TRIAL    Still presents with visible weakness and fatigue with lunges - forward and backwards and with eccentric step downs     Pain Level (0-10 scale) post treatment: 1/10    ASSESSMENT/Changes in Function: . Patient will continue to benefit from skilled PT services to address functional mobility deficits, address ROM deficits and address strength deficits to attain remaining goals. Progress towards goals / Updated goals:   New Goals to be achieved in __4__  weeks:  1. Pt will be able to improve R PF strength to 5/5 as evident by ability to perform 25 SL HR to improve functional gait pattern. PROGRESSING 8/21/18 - 17 SEC 1ST TRIAL AND 12 SEC 2ND TRIAL  2. Pt will improve functional DF to be able to perform a 6\" step down to improve stair negotiation. 3. Pt will be able demonstrating normalized gait pattern and decreased pain to improve overall activity tolerance on unlevel surfaces. Limited decreased Heel toe pattern on the R due to PF weakness.  8/9/18       PLAN  [x]  Upgrade activities as tolerated     [x]  Continue plan of care  []  Update interventions per flow sheet       []  Discharge due to:_  []  Other:    Tierra Schaeffer, PTA 8/21/2018 1113  AM

## 2018-08-23 ENCOUNTER — HOSPITAL ENCOUNTER (OUTPATIENT)
Dept: PHYSICAL THERAPY | Age: 48
Discharge: HOME | End: 2018-08-23
Payer: COMMERCIAL

## 2018-08-23 PROCEDURE — 97110 THERAPEUTIC EXERCISES: CPT | Performed by: PHYSICAL THERAPIST

## 2018-08-23 PROCEDURE — 97016 VASOPNEUMATIC DEVICE THERAPY: CPT | Performed by: PHYSICAL THERAPIST

## 2018-08-23 NOTE — PROGRESS NOTES
PT DAILY TREATMENT NOTE     Patient Name: Delio Moser  Date:2018  : 1970  [x]  Patient  Verified  Payor: Claudeen Lao / Plan: Agustina Miranda / Product Type: HMO /    In time:900 Out time:10:20  Total Treatment Time (min): 80  1:1 Treatment Time (min): 30  Visit #: 7 of 8    Treatment Area: Fracture of tibia [C48.625U]    SUBJECTIVE  Pain Level (0-10 scale): 2/10  Any medication changes, allergies to medications, adverse drug reactions, diagnosis change, or new procedure performed?: [x] No    [] Yes (see summary sheet for update)  Subjective functional status/changes:   [] No changes reported  My leg is still really weak.     OBJECTIVE  Modality rationale: decrease inflammation and decrease pain to improve the patients ability to assist in normalizing gait pattern    Min Type Additional Details    [] Estim: []Att   []Unatt        []TENS instruct                  []IFC  []Premod   []NMES                     []Other:  []w/US   []w/ice   []w/heat  Position:  Location:    []  Traction: [] Cervical       []Lumbar                       [] Prone          []Supine                       []Intermittent   []Continuous Lbs:  [] before manual  [] after manual    []  Ultrasound: []Continuous   [] Pulsed                           []1MHz   []3MHz Location:  W/cm2:    []  Iontophoresis with dexamethasone         Location: [] Take home patch   [] In clinic    []  Ice     []  heat  []  Ice massage Position:  Location:   15  [x]  Vasopneumatic Device - ankle and leg  Pressure:       [] lo [x] med [] hi   Temperature: [x] lo [] med [] hi   [x] Skin assessment post-treatment:  [x]intact []redness- no adverse reaction       []redness  adverse reaction:       65 min Therapeutic Exercise:  [x] See flow sheet :    Rationale: increase strength, improve balance and increase proprioception to improve the patients ability to normalize gait         min Patient Education: [x] Review HEP    [] Progressed/Changed HEP based on:   [] positioning   [] body mechanics   [] transfers   [] heat/ice application        Other Objective/Functional Measures:   Noted mm quivering with eccentric heel raises on TG level 11  Continued weakness in the R Plantar flexors limiting push-off in gait    Pain Level (0-10 scale) post treatment: 0/10    ASSESSMENT/Changes in Function: . Pt continues to present with noted weakness in the R Plantar flexors limiting normalized gait pattern. Pt due for reassessment next session. Pt is limited by strength in the R lower leg and is able to perform all strengthening therex at home. Pt to be placed on 1 month therapy hold to perform HEP IND home and FU in 1 month. Patient will continue to benefit from skilled PT services to address functional mobility deficits, address ROM deficits and address strength deficits to attain remaining goals. Progress towards goals / Updated goals:   New Goals to be achieved in __4__  weeks:  1. Pt will be able to improve R PF strength to 5/5 as evident by ability to perform 25 SL HR to improve functional gait pattern. PROGRESSING 8/21/18 - 17 SEC 1ST TRIAL AND 12 SEC 2ND TRIAL  2. Pt will improve functional DF to be able to perform a 6\" step down to improve stair negotiation. 3. Pt will be able demonstrating normalized gait pattern and decreased pain to improve overall activity tolerance on unlevel surfaces. Limited decreased Heel toe pattern on the R due to PF weakness.  8/9/18       PLAN  [x]  Upgrade activities as tolerated     [x]  Continue plan of care  []  Update interventions per flow sheet       []  Discharge due to:_  [x]  Other: reassess next session    Terry Chavez DPT 8/23/2018 1113  AM

## 2018-08-28 ENCOUNTER — HOSPITAL ENCOUNTER (OUTPATIENT)
Dept: PHYSICAL THERAPY | Age: 48
Discharge: HOME | End: 2018-08-28
Payer: COMMERCIAL

## 2018-08-28 ENCOUNTER — APPOINTMENT (OUTPATIENT)
Dept: PHYSICAL THERAPY | Age: 48
End: 2018-08-28
Payer: COMMERCIAL

## 2018-08-28 PROCEDURE — 97110 THERAPEUTIC EXERCISES: CPT | Performed by: PHYSICAL THERAPIST

## 2018-08-28 PROCEDURE — 97016 VASOPNEUMATIC DEVICE THERAPY: CPT | Performed by: PHYSICAL THERAPIST

## 2018-08-28 NOTE — PROGRESS NOTES
PT DAILY TREATMENT NOTE  Patient Name: George Rao Date:2018 : 1970 [x]  Patient  Verified Payor: [employer] / Plan: Cari Beltrán / Product Type: HMO /   
In time:135 Out time:245 Total Treatment Time (min): 70 
1:1 Treatment Time (min): 30 Visit #: 8 of 8 Treatment Area: Fracture of tibia [S81.489Y] SUBJECTIVE Pain Level (0-10 scale): 2/10 Any medication changes, allergies to medications, adverse drug reactions, diagnosis change, or new procedure performed?: [x] No    [] Yes (see summary sheet for update) Subjective functional status/changes:   [] No changes reported Pt reports about 60% improvement since [facility]. Pt reports improvements in overall pain, ROM, strength, and confidence. Functional limitations include pain in the lateral aspect secondary to the fracture that is still on the R side and overall weakness that is inhibiting his ability to walk correctly. OBJECTIVE Modality rationale: decrease inflammation and decrease pain to improve the patients ability to assist in normalizing gait pattern Min Type Additional Details  
 [] Estim: []Att   []Unatt        []TENS instruct []IFC  []Premod   []NMES []Other:  []w/US   []w/ice   []w/heat Position: Location:  
 []  Traction: [] Cervical       []Lumbar 
                     [] Prone          []Supine []Intermittent   []Continuous Lbs: 
[] before manual 
[] after manual  
 []  Ultrasound: []Continuous   [] Pulsed []1MHz   []3MHz Location: 
W/cm2:  
 []  Iontophoresis with dexamethasone Location: [] Take home patch  
[] In clinic  
 []  Ice     []  heat 
[]  Ice massage Position: Location:  
15  [x]  Vasopneumatic Device - ankle and leg  Pressure:       [] lo [x] med [] hi  
Temperature: [x] lo [] med [] hi  
[x] Skin assessment post-treatment:  [x]intact []redness- no adverse reaction []redness  adverse reaction:  
 
 
55 min Therapeutic Exercise:  [x] See flow sheet : PT reassessment Rationale: increase strength, improve balance and increase proprioception to improve the patients ability to normalize gait 
     
 min Patient Education: [x] Review HEP    [] Progressed/Changed HEP based on:  
[] positioning   [] body mechanics   [] transfers   [] heat/ice application Other Objective/Functional Measures: SLS: Pt unable to perform >15 sec Pt unable to perform SL HR on the R 
Gait pattern: decreased heel-toe pattern on the R with early push-off. Noted decreased step length on the L. 
AROM of the R ankle DF: 0 deg Pain Level (0-10 scale) post treatment: 1/10 ASSESSMENT/Changes in Function: . Upon reassessment, pt continues to present with noted weakness in the R Plantar flexors limiting normalized gait pattern, At this time pt is able to complete all therex IND at home as his strengthening is going to take time. Since last assessment pt has min improvements and compliance to HEP is questionable. Pt to be placed on 1 month therapy hold to perform HEP IND home and FU in 1 month to evaluate improvement. Patient will continue to benefit from skilled PT services to address functional mobility deficits, address ROM deficits and address strength deficits to attain remaining goals. Progress towards goals / Updated goals: 
 New Goals to be achieved in __4__  weeks: 1. Pt will be able to improve R PF strength to 5/5 as evident by ability to perform 25 SL HR to improve functional gait pattern. Unable to perform 1 HR 8/28/18 2. Pt will improve functional DF to be able to perform a 6\" step down to improve stair negotiation. Able to perform 4\" lateral 8/28/18 3. Pt will be able demonstrating normalized gait pattern and decreased pain to improve overall activity tolerance on unlevel surfaces. Limited decreased Heel toe pattern on the R due to PF weakness. 8/28/18 
  
 
 PLAN 
[x]  Upgrade activities as tolerated     [x]  Continue plan of care 
[]  Update interventions per flow sheet      
[]  Discharge due to:_ 
[x]  Other: Pt to trial IND HEP x1 month and FU ANDRES DennyT 8/28/2018 1113  AM

## 2018-08-30 ENCOUNTER — APPOINTMENT (OUTPATIENT)
Dept: PHYSICAL THERAPY | Age: 48
End: 2018-08-30
Payer: COMMERCIAL

## 2018-09-05 ENCOUNTER — OFFICE VISIT (OUTPATIENT)
Dept: ORTHOPEDIC SURGERY | Facility: CLINIC | Age: 48
End: 2018-09-05

## 2018-09-05 VITALS
HEIGHT: 75 IN | HEART RATE: 81 BPM | SYSTOLIC BLOOD PRESSURE: 133 MMHG | DIASTOLIC BLOOD PRESSURE: 87 MMHG | WEIGHT: 247.6 LBS | OXYGEN SATURATION: 96 % | BODY MASS INDEX: 30.79 KG/M2 | TEMPERATURE: 97.4 F | RESPIRATION RATE: 16 BRPM

## 2018-09-05 DIAGNOSIS — S82.451D CLOSED DISPLACED COMMINUTED FRACTURE OF SHAFT OF RIGHT FIBULA WITH ROUTINE HEALING, SUBSEQUENT ENCOUNTER: ICD-10-CM

## 2018-09-05 DIAGNOSIS — Z71.9 HEALTH EDUCATION/COUNSELING: Primary | ICD-10-CM

## 2018-09-05 DIAGNOSIS — S82.251D CLOSED DISPLACED COMMINUTED FRACTURE OF SHAFT OF RIGHT TIBIA WITH ROUTINE HEALING, SUBSEQUENT ENCOUNTER: ICD-10-CM

## 2018-09-05 NOTE — PROGRESS NOTES
HISTORY:  Rainer Eldridge returns with additional questions regarding his healing associated [place] secondary to a tibia-fibula fracture, severe. He was last seen by Dr. Ara Barnett and noted to follow up p.r.n. He had several questions regarding appropriate healing and unresolved pain to the outer portion of the right lower leg. He had a noted fracture deformity comminution of the fibula. There was an IM nail plate spanning the tibia fracture point and that has continued to mature nicely. He has full weightbearing of his right lower extremity. Today, we discussed recovery pains and strength confirms which he inquired about. PLAN:  He is going to continue his home exercise program.  We will go ahead and see him back in about 6 weeks, re-image the right lower leg secondary to his perseveration concerning possible slow healing and/or lack of healing to the comminuted fibula fracture. Again, I stressed to him that his process of healing was on par with the majority of our patients who have had similar injuries and that he was doing, actually, pretty well in my opinion. This echoes the opinion of Dr. Ara Barnett, the provider which saw him at his most recent visit. Today, to the best of my abilities, all of Mr. Barney Moralez questions were answered to his satisfaction. No x-rays were warranted today. We will, again, see him back in 6 weeks.   Again, he understands that there are going to be some days which may be more painful than others secondary to his activity level and possible environmental changes such as wet cool weather, etc.

## 2018-09-20 DIAGNOSIS — S82.91XA CLOSED FRACTURE OF RIGHT LOWER LEG, INITIAL ENCOUNTER: ICD-10-CM

## 2018-09-20 RX ORDER — OXYCODONE AND ACETAMINOPHEN 7.5; 325 MG/1; MG/1
1 TABLET ORAL
Qty: 28 TAB | Refills: 0 | Status: CANCELLED | OUTPATIENT
Start: 2018-09-20

## 2018-09-20 RX ORDER — OXYCODONE AND ACETAMINOPHEN 7.5; 325 MG/1; MG/1
1 TABLET ORAL
Qty: 28 TAB | Refills: 0 | Status: SHIPPED | OUTPATIENT
Start: 2018-09-20 | End: 2018-10-18 | Stop reason: SDUPTHER

## 2018-09-26 NOTE — PROGRESS NOTES
[address] [facility] AT [facility] [address]  Phone [phone]  Fax [phone] PROGRESS NOTE Patient Name: Brayden Kelley : 1970 Treatment/Medical Diagnosis: Fracture of tibia [S82.209A] Referral Source: Raine Esquivel MD    
Date of Initial Visit: 18 Attended Visits: 31 Missed Visits: 4 Comorbidities: thyroid problems SUMMARY OF TREATMENT Patient's sp R Tib/fib fracture DOS 3/21/18. POC has consisted of therex, therapeutic activities, manual therapy prn, modalities prn, NM re-ed, pt. education, and a comprehensive HEP. Treatment strategies used to address functional mobility deficits, ROM deficits, strength deficits, analyze and address soft tissue restrictions, analyze and cue movement patterns, analyze and modify body mechanics/ergonomics, assess and modify postural abnormalities and instruct in home and community integration. CURRENT STATUS Pt reports about 60% improvement since [facility]. Pt reports improvements in overall pain, ROM, strength, and confidence. Functional limitations include pain in the lateral aspect secondary to the fracture that is still on the R side and overall weakness that is inhibiting his ability to walk correctly. Upon reassessment, pt continues to present with noted weakness in the R Plantar flexors limiting normalized gait pattern, At this time pt is able to complete all therex IND at home as his strengthening is going to take time. Since last assessment pt has min improvements and compliance to HEP is questionable. Pt to be placed on 1 month therapy hold to perform HEP IND home and FU in 1 month to evaluate improvement. Other Objective/Functional Measures:   18 SLS: Pt unable to perform >15 sec Pt unable to perform SL HR on the R 
Gait pattern: decreased heel-toe pattern on the R with early push-off. Noted decreased step length on the L. AROM of the R ankle DF: 0 deg 
   
7/26/18: 
Pt able to ambulate without AD demonstrating decreased heel-toe patten on the R and decreased step length on the L Stair negotiation: noted decreased eccentric control in the R quad with descent. AROM on the R ankle: DF: 0 deg, PF: 40 deg, EV: 0 deg, IN: 20 deg SL stance on the R: 8 seconds SL HR on the R: able to perform 1 with 50% motion Noted myofascial restrictions throughout the R Gastroc/Soleus/Peroneals-reduced with myofascial cupping FOTO:  
 
 
GOALS AS OF EVALUATION/PREVIOUS PROGRESS NOTE: 
1. Pt will be able to improve R PF strength to 5/5 as evident by ability to perform 25 SL HR to improve functional gait pattern. Unable to perform 1 HR 8/28/18 2. Pt will improve functional DF to be able to perform a 6\" step down to improve stair negotiation. Able to perform 4\" lateral 8/28/18 3. Pt will be able demonstrating normalized gait pattern and decreased pain to improve overall activity tolerance on unlevel surfaces. Limited decreased Heel toe pattern on the R due to PF weakness. 8/28/18 
   
 
New Goals to be achieved in __4__  weeks: 1. Pt will be able to improve R PF strength to 5/5 as evident by ability to perform 25 SL HR to improve functional gait pattern. 2. Pt will improve functional DF to be able to perform a 6\" step down to improve stair negotiation. 3. Pt will be able demonstrating normalized gait pattern and decreased pain to improve overall activity tolerance on unlevel surfaces. RECOMMENDATIONS Pt to trial IND HEP x1 month and FU for 1 visit. Will decide if continuation of care is warranted vs. DC at FU visit.  
  
 
If you have any questions/comments please contact us directly at [phone]. Thank you for allowing us to assist in the care of your patient. Therapist Signature: Juan Jose Freeman DPT Date: 8/28/2018 Certification Period: 
 
Reporting period Medicare only  Time: 700 PM  
 NOTE TO PHYSICIAN:  PLEASE COMPLETE THE ORDERS BELOW AND FAX TO [facility]: [phone]. If you are unable to process this request in 24 hours please contact our office: [phone]. 
 
___ I have read the above report and request that my patient continue as recommended.  
___ I have read the above report and request that my patient continue therapy with the following changes/special instructions:_________________________________________________________  
___ I have read the above report and request that my patient be discharged from therapy.   
 
Physician Signature:        Date:       Time:

## 2018-10-18 DIAGNOSIS — S82.91XA CLOSED FRACTURE OF RIGHT LOWER LEG, INITIAL ENCOUNTER: ICD-10-CM

## 2018-10-18 RX ORDER — OXYCODONE AND ACETAMINOPHEN 7.5; 325 MG/1; MG/1
1 TABLET ORAL
Qty: 28 TAB | Refills: 0 | Status: SHIPPED | OUTPATIENT
Start: 2018-10-18 | End: 2018-11-16 | Stop reason: SDUPTHER

## 2018-10-18 RX ORDER — IBUPROFEN 800 MG/1
800 TABLET ORAL
Qty: 60 TAB | Refills: 2 | Status: SHIPPED | OUTPATIENT
Start: 2018-10-18 | End: 2018-11-16 | Stop reason: SDUPTHER

## 2018-11-07 ENCOUNTER — OFFICE VISIT (OUTPATIENT)
Dept: FAMILY MEDICINE CLINIC | Age: 48
End: 2018-11-07

## 2018-11-07 ENCOUNTER — HOSPITAL ENCOUNTER (OUTPATIENT)
Dept: LAB | Age: 48
Discharge: HOME | End: 2018-11-07

## 2018-11-07 VITALS
BODY MASS INDEX: 30.84 KG/M2 | WEIGHT: 248 LBS | TEMPERATURE: 98.2 F | RESPIRATION RATE: 17 BRPM | HEIGHT: 75 IN | SYSTOLIC BLOOD PRESSURE: 127 MMHG | DIASTOLIC BLOOD PRESSURE: 91 MMHG | HEART RATE: 83 BPM | OXYGEN SATURATION: 98 %

## 2018-11-07 DIAGNOSIS — Z12.5 SCREENING FOR PROSTATE CANCER: ICD-10-CM

## 2018-11-07 DIAGNOSIS — Z13.21 ENCOUNTER FOR VITAMIN DEFICIENCY SCREENING: ICD-10-CM

## 2018-11-07 DIAGNOSIS — Z13.1 SCREENING FOR DIABETES MELLITUS: ICD-10-CM

## 2018-11-07 DIAGNOSIS — E03.9 ACQUIRED HYPOTHYROIDISM: ICD-10-CM

## 2018-11-07 DIAGNOSIS — Z00.00 ROUTINE MEDICAL EXAM: Primary | ICD-10-CM

## 2018-11-07 DIAGNOSIS — Z13.0 SCREENING FOR DEFICIENCY ANEMIA: ICD-10-CM

## 2018-11-07 DIAGNOSIS — Z13.220 SCREENING FOR HYPERLIPIDEMIA: ICD-10-CM

## 2018-11-07 DIAGNOSIS — Z12.11 SCREENING FOR COLON CANCER: ICD-10-CM

## 2018-11-07 PROCEDURE — 99001 SPECIMEN HANDLING PT-LAB: CPT

## 2018-11-07 RX ORDER — LEVOTHYROXINE SODIUM 75 UG/1
75 TABLET ORAL
Qty: 90 TAB | Refills: 1 | Status: SHIPPED | OUTPATIENT
Start: 2018-11-07

## 2018-11-07 NOTE — PROGRESS NOTES
Subjective:  
Rocío Rubio is a 46 y.o. male presenting for his annual checkup. ROS:  Feeling well. No dyspnea or chest pain on exertion. No abdominal pain, change in bowel habits, black or bloody stools. No urinary tract or prostatic symptoms. No neurological complaints. Specific concerns today: none. He is still seeing ortho for his open fracture of his right leg. He thinks it is improving now. It has been a slow recovery for him. There are no active problems to display for this patient. Current Outpatient Medications Medication Sig Dispense Refill  levothyroxine (SYNTHROID) 75 mcg tablet Take 1 Tab by mouth Daily (before breakfast). 90 Tab 1  ibuprofen (MOTRIN) 800 mg tablet Take 1 Tab by mouth every six (6) hours as needed for Pain. 60 Tab 2  
 oxyCODONE-acetaminophen (PERCOCET 7.5) 7.5-325 mg per tablet Take 1 Tab by mouth every eight (8) hours as needed for Pain. Max Daily Amount: 3 Tabs. 28 Tab 0  
 acetaminophen (TYLENOL) 325 mg tablet Take  by mouth every four (4) hours as needed for Pain. No Known Allergies Past Medical History:  
Diagnosis Date  Thyroid disease Past Surgical History:  
Procedure Laterality Date [facility] No family history on file. Social History Tobacco Use  Smoking status: Former Smoker  Smokeless tobacco: Never Used Substance Use Topics  Alcohol use: No  
  
 
 Review of Systems Constitutional: Negative. HENT: Negative. Eyes: Negative. Respiratory: Negative. Cardiovascular: Negative. Gastrointestinal: Negative. Genitourinary: Negative. Musculoskeletal: Positive for joint pain (right leg pain). Skin: Negative. Neurological: Negative. Endo/Heme/Allergies: Negative. Psychiatric/Behavioral: Negative. Objective:  
 
Visit Vitals BP (!) 127/91 (BP 1 Location: Left arm, BP Patient Position: Sitting) Pulse 83 Temp 98.2 °F (36.8 °C) (Oral) Resp 17 Ht 6' 3\" (1.905 m) Wt 248 lb (112.5 kg) SpO2 98% BMI 31.00 kg/m² The patient appears well, alert, oriented x 3, in no distress. ENT normal.  Neck supple. No adenopathy or thyromegaly. KRIS. Lungs are clear, good air entry, no wheezes, rhonchi or rales. S1 and S2 normal, no murmurs, regular rate and rhythm. Abdomen is soft without tenderness, guarding, mass or organomegaly. no hernias. Extremities show no edema, normal peripheral pulses. Neurological is normal without focal findings. Assessment/Plan:  
healthy adult male 
increase physical activity, routine labs ordered. ICD-10-CM ICD-9-CM 1. Routine medical exam Z00.00 V70.0 2. Screening for deficiency anemia Z13.0 V78.1 HGB & HCT  
   HGB & HCT 3. Acquired hypothyroidism E03.9 244.9 TSH 3RD GENERATION  
   TSH 3RD GENERATION 4. Encounter for vitamin deficiency screening Z13.21 V77.99 VITAMIN D, 25 HYDROXY  
   VITAMIN D, 25 HYDROXY 5. Screening for prostate cancer Z12.5 V76.44 PSA SCREENING (SCREENING) PSA SCREENING (SCREENING) 6. Screening for colon cancer Z12.11 V76.51 REFERRAL TO GASTROENTEROLOGY CANCELED: REFERRAL TO GASTROENTEROLOGY 7. Screening for diabetes mellitus D72.7 R97.1 METABOLIC PANEL, COMPREHENSIVE  
   METABOLIC PANEL, COMPREHENSIVE 8. Screening for hyperlipidemia Z13.220 V77.91 LIPID PANEL  
   LIPID PANEL Mickeal Lynk PLAN: 
We discussed screening recommendations. I have discussed the diagnosis with the patient and the intended plan as seen in the above orders. The patient has received an after-visit summary and questions were answered concerning future plans. I have discussed medication side effects and warnings with the patient as well. Patient will call for further questions. Follow-up Disposition: 
Return in about 6 months (around 5/7/2019) for chronic care.

## 2018-11-07 NOTE — PROGRESS NOTES
Chief Complaint Patient presents with  Well Male 1. Have you been to the ER, urgent care clinic since your last visit? Hospitalized since your last visit? No 
 
2. Have you seen or consulted any other health care providers outside of the [facility] since your last visit? Include any pap smears or colon screening.  No

## 2018-11-07 NOTE — PROGRESS NOTES
Subjective:  
Amanda Ramos is a 46 y.o. male presenting for his annual checkup. ROS:  Feeling well. No dyspnea or chest pain on exertion. No abdominal pain, change in bowel habits, black or bloody stools. No urinary tract or prostatic symptoms. No neurological complaints. Specific concerns today: ***. 
 
{Choose one or more SmartLinks; press DELETE if none desired:[id number]} {Choose one or more Last Lab values; press DELETE if none desired:[id number]} Objective: There were no vitals taken for this visit. The patient appears well, alert, oriented x 3, in no distress. ENT normal.  Neck supple. No adenopathy or thyromegaly. KRIS. Lungs are clear, good air entry, no wheezes, rhonchi or rales. S1 and S2 normal, no murmurs, regular rate and rhythm. Abdomen is soft without tenderness, guarding, mass or organomegaly.  exam: {pe male genitalia:861842::\"no penile lesions or discharge, no testicular masses or tenderness, no hernias\"}. Extremities show no edema, normal peripheral pulses. Neurological is normal without focal findings. Assessment/Plan:  
healthy adult male 
{disease follow up plans:963955}. {Assessment and Plan:73146}.

## 2018-11-08 LAB
25(OH)D3+25(OH)D2 SERPL-MCNC: 25.5 NG/ML (ref 30–100)
ALBUMIN SERPL-MCNC: 4.8 G/DL (ref 3.5–5.5)
ALBUMIN/GLOB SERPL: 1.7 {RATIO} (ref 1.2–2.2)
ALP SERPL-CCNC: 142 IU/L (ref 39–117)
ALT SERPL-CCNC: 83 IU/L (ref 0–44)
AST SERPL-CCNC: 48 IU/L (ref 0–40)
BILIRUB SERPL-MCNC: 1.1 MG/DL (ref 0–1.2)
BUN SERPL-MCNC: 18 MG/DL (ref 6–24)
BUN/CREAT SERPL: 15 (ref 9–20)
CALCIUM SERPL-MCNC: 9.8 MG/DL (ref 8.7–10.2)
CHLORIDE SERPL-SCNC: 105 MMOL/L (ref 96–106)
CHOLEST SERPL-MCNC: 205 MG/DL (ref 100–199)
CO2 SERPL-SCNC: 19 MMOL/L (ref 20–29)
CREAT SERPL-MCNC: 1.24 MG/DL (ref 0.76–1.27)
GLOBULIN SER CALC-MCNC: 2.9 G/DL (ref 1.5–4.5)
GLUCOSE SERPL-MCNC: 90 MG/DL (ref 65–99)
HCT VFR BLD AUTO: 49.7 % (ref 37.5–51)
HDLC SERPL-MCNC: 47 MG/DL
HGB BLD-MCNC: 16.5 G/DL (ref 13–17.7)
LDLC SERPL CALC-MCNC: 139 MG/DL (ref 0–99)
POTASSIUM SERPL-SCNC: 4.9 MMOL/L (ref 3.5–5.2)
PROT SERPL-MCNC: 7.7 G/DL (ref 6–8.5)
PSA SERPL-MCNC: 1.4 NG/ML (ref 0–4)
SODIUM SERPL-SCNC: 143 MMOL/L (ref 134–144)
TRIGL SERPL-MCNC: 93 MG/DL (ref 0–149)
TSH SERPL DL<=0.005 MIU/L-ACNC: 4.74 UIU/ML (ref 0.45–4.5)
VLDLC SERPL CALC-MCNC: 19 MG/DL (ref 5–40)

## 2018-12-04 ENCOUNTER — PATIENT MESSAGE (OUTPATIENT)
Dept: FAMILY MEDICINE CLINIC | Age: 48
End: 2018-12-04

## 2019-02-20 NOTE — PROGRESS NOTES
[address] [facility] AT [facility] [address]  Phone [phone]  Fax [phone] DISCHARGE SUMMARY Patient Name: Enmanuel Chapman : 1970 Treatment/Medical Diagnosis: Fracture of tibia [S82.209A] Referral Source: Walter Gaspar MD    
Date of Initial Visit: 18 Attended Visits: 31 Missed Visits: 4 SUMMARY OF TREATMENT Pt last seen on 19 for a reassessment. Pt was placed on hold x1 to trial IND HEP and failed to make a FU appt after 30 days. Pt is assumed to be doing well and therefore, is to be DC'd from PT at this time. Please see PN on 19 for final measurements. RECOMMENDATIONS Discontinue therapy. Progressing towards or have reached established goals. If you have any questions/comments please contact us directly at [phone]. Thank you for allowing us to assist in the care of your patient. Therapist Signature: Chris Traylor DPT Date: 19   Time: 3:36 PM

## 2021-06-13 NOTE — TELEPHONE ENCOUNTER
-Smokes a half a pack a day  -Nicotine patch offered Patient called in states that he was in saw [employer] and had some FMLA forms filled out. His HR dept is telling him things were missing and he was requesting to speak with Tiffany HOUSTON as she was the one helping. Please contact him at [phone].

## 2024-05-07 NOTE — PROGRESS NOTES
PT DAILY TREATMENT NOTE     Patient Name: Guille Burciaga  Date:2018  : 1970  [x]  Patient  Verified  Payor: Mateusz Spencer / Plan: Landon Belling / Product Type: HMO /    In time: 9:05 Out time:10:10  Total Treatment Time (min): 65  1:1 time = 30  Visit #: 5 of     Treatment Area: Fracture of tibia [W98.552T]    SUBJECTIVE  Pain Level IN: (0-10 scale): 210 knee  Pain Level OUT: (0-10 scale) post treatment: 10    Any medication changes, allergies to medications, adverse drug reactions, diagnosis change, or new procedure performed?: [x] No    [] Yes (see summary sheet for update)  Subjective functional status/changes:   [] No changes reported   I see the doctor today - I have been trying to walk a little bit without my cane    OBJECTIVE    Modality rationale: decrease edema, decrease inflammation and decrease pain to improve the patients ability to weight bear into the (R) leg    Min Type Additional Details    [] Estim:  []Unatt       []IFC  []Premod                        []Other:  []w/ice   []w/heat  Position:  Location:    [] Estim: []Att    []TENS instruct  []NMES                    []Other:  []w/US   []w/ice   []w/heat  Position:  Location:    []  Traction: [] Cervical       []Lumbar                       [] Prone          []Supine                       []Intermittent   []Continuous Lbs:  [] before manual  [] after manual    []  Ultrasound: []Continuous   [] Pulsed                           []1MHz   []3MHz W/cm2:  Location:    []  Iontophoresis with dexamethasone         Location: [] Take home patch   [] In clinic    []  Ice     []  heat  []  Ice massage  []  Laser   []  Anodyne Position:  Location:    []  Laser with stim  []  Other:  Position:  Location:   PD [x]  Vasopneumatic Device - to (R) leg and (R) foot/ankle Pressure:       [] lo [x] med [] hi   Temperature: [x] lo [] med [] hi   [] Skin assessment post-treatment:  []intact []redness- no adverse reaction    []redness  adverse reaction:       45 min Therapeutic Exercise:  [x] See flow sheet :   Added BAPS board in standing - level 2  Added total gym with double squats   Rationale: increase ROM, increase strength, improve coordination, improve balance and increase proprioception to improve the patients ability to ambulate with normal gait pattern     20 min Manual Therapy: Technique:      [x] S/DTM []IASTM [x]PROM [x] Passive Stretching   [x] Graston:  [] GT 1  [] GT 2 [x] GT 3 [] GT 4 [] GT 4 [] GT 5  [] GT 6  [x] Sweep [x] Fan [] [place]  [x] Brush   [x]  Swivel []J- Stroke [] Scoop []IFraming     []Manual TPR  [] TDN (see objective; actual needle insertion time not billed)  [x]Jt manipulation:Gr I [] II []  III [x] IV[] V[]  Treatment/Area:R LE/ Peroneals/Anterior tib and graston to the plantar surface of the foot    Rationale:      decrease pain, increase ROM, increase tissue extensibility and decrease trigger points to improve patient's ability to ambulate and return to normal ADLs       With   [] TE   [] TA   [] neuro   [x] other: Patient Education: [x] Review HEP    [] Progressed/Changed HEP based on:   [] positioning   [] body mechanics   [] transfers   [] heat/ice application    [] Graston Education: Explained the effects and benefits of Graston Technique therapy including potential for post treatment soreness and bruising.   [x] Other:       Objective/Functional Measures with Therapist Assessment Noted with Response to Therapy Session:  Patient was able to take a couple of steps without the cane   Patient was challenged with standing BAPS and performing CW and CCW         ASSESSMENT/Changes in Function:    Patient will continue to benefit from skilled PT services to modify and progress therapeutic interventions, address functional mobility deficits, address ROM deficits, address strength deficits, analyze and modify body mechanics/ergonomics, assess and modify postural abnormalities and instruct in home and community integration to attain remaining goals. Progress towards goals / Updated goals:  1. Pt will improve R ankle ROM = to the L to improve gait mechanics. 2. Pt will improve R ankle strength to 5/5 to be able to ambulate without AD demonstrating normalized gait on level/unlevel surfaces progressed to [facility] this session 5/31/18  3. Pt will be able to perform SLS on the R LE for 30 sec to improve proprioception and SL balance. Unable secondary to pain 5/31/18  4. Pt will improve FOTO score to at least 59/100 as a functional indicator of improved mobility.      PLAN  [x]  Upgrade activities as tolerated     [x]  Continue plan of care  []  Update interventions per flow sheet       []  Discharge due to:_  []  Other:    Margaret Zheng PTA 6/12/2018  1222 PM    Future Appointments  Date Time Provider Twila Hale   6/12/2018 2:30 PM Amanda Cornelius MD [facility]   6/15/2018 8:30 AM Margaret Zheng PTA [facility]   6/19/2018 9:00 AM Margaret Zheng PTA [facility]   6/21/2018 9:00 AM Jael Holguin DPT [facility]   6/26/2018 9:00 AM Margaret Zheng PTA [facility]   6/29/2018 8:30 AM Margaret Zheng PTA [facility] 36.7